# Patient Record
Sex: FEMALE | Race: WHITE | Employment: OTHER | URBAN - METROPOLITAN AREA
[De-identification: names, ages, dates, MRNs, and addresses within clinical notes are randomized per-mention and may not be internally consistent; named-entity substitution may affect disease eponyms.]

---

## 2017-01-27 ENCOUNTER — COMMUNICATION - HEALTHEAST (OUTPATIENT)
Dept: BEHAVIORAL HEALTH | Facility: CLINIC | Age: 72
End: 2017-01-27

## 2017-01-27 DIAGNOSIS — S09.90XA DEPRESSION DUE TO HEAD INJURY: ICD-10-CM

## 2017-01-27 DIAGNOSIS — F32.A DEPRESSION DUE TO HEAD INJURY: ICD-10-CM

## 2017-02-06 ENCOUNTER — OFFICE VISIT - HEALTHEAST (OUTPATIENT)
Dept: BEHAVIORAL HEALTH | Facility: CLINIC | Age: 72
End: 2017-02-06

## 2017-02-06 DIAGNOSIS — S09.90XA DEPRESSION DUE TO HEAD INJURY: ICD-10-CM

## 2017-02-06 DIAGNOSIS — F32.A DEPRESSION DUE TO HEAD INJURY: ICD-10-CM

## 2017-02-06 ASSESSMENT — MIFFLIN-ST. JEOR: SCORE: 1273.33

## 2017-03-08 ENCOUNTER — COMMUNICATION - HEALTHEAST (OUTPATIENT)
Dept: BEHAVIORAL HEALTH | Facility: CLINIC | Age: 72
End: 2017-03-08

## 2017-03-08 DIAGNOSIS — S09.90XA DEPRESSION DUE TO HEAD INJURY: ICD-10-CM

## 2017-03-08 DIAGNOSIS — F32.A DEPRESSION DUE TO HEAD INJURY: ICD-10-CM

## 2017-05-08 ENCOUNTER — COMMUNICATION - HEALTHEAST (OUTPATIENT)
Dept: BEHAVIORAL HEALTH | Facility: CLINIC | Age: 72
End: 2017-05-08

## 2017-05-31 ENCOUNTER — COMMUNICATION - HEALTHEAST (OUTPATIENT)
Dept: BEHAVIORAL HEALTH | Facility: CLINIC | Age: 72
End: 2017-05-31

## 2017-05-31 DIAGNOSIS — F06.30 MOOD DISORDER IN CONDITIONS CLASSIFIED ELSEWHERE: ICD-10-CM

## 2017-06-23 ENCOUNTER — COMMUNICATION - HEALTHEAST (OUTPATIENT)
Dept: BEHAVIORAL HEALTH | Facility: CLINIC | Age: 72
End: 2017-06-23

## 2017-06-23 DIAGNOSIS — F06.30 MOOD DISORDER IN CONDITIONS CLASSIFIED ELSEWHERE: ICD-10-CM

## 2017-07-20 ENCOUNTER — COMMUNICATION - HEALTHEAST (OUTPATIENT)
Dept: BEHAVIORAL HEALTH | Facility: CLINIC | Age: 72
End: 2017-07-20

## 2017-07-20 DIAGNOSIS — F06.30 MOOD DISORDER IN CONDITIONS CLASSIFIED ELSEWHERE: ICD-10-CM

## 2017-07-24 ENCOUNTER — OFFICE VISIT - HEALTHEAST (OUTPATIENT)
Dept: BEHAVIORAL HEALTH | Facility: CLINIC | Age: 72
End: 2017-07-24

## 2017-07-24 DIAGNOSIS — F06.30 MOOD DISORDER IN CONDITIONS CLASSIFIED ELSEWHERE: ICD-10-CM

## 2017-07-24 ASSESSMENT — MIFFLIN-ST. JEOR: SCORE: 1291.47

## 2017-08-09 ENCOUNTER — COMMUNICATION - HEALTHEAST (OUTPATIENT)
Dept: BEHAVIORAL HEALTH | Facility: CLINIC | Age: 72
End: 2017-08-09

## 2017-08-09 DIAGNOSIS — F06.30 MOOD DISORDER IN CONDITIONS CLASSIFIED ELSEWHERE: ICD-10-CM

## 2017-10-23 ENCOUNTER — COMMUNICATION - HEALTHEAST (OUTPATIENT)
Dept: BEHAVIORAL HEALTH | Facility: CLINIC | Age: 72
End: 2017-10-23

## 2017-12-08 ENCOUNTER — COMMUNICATION - HEALTHEAST (OUTPATIENT)
Dept: BEHAVIORAL HEALTH | Facility: CLINIC | Age: 72
End: 2017-12-08

## 2017-12-08 DIAGNOSIS — F06.30 MOOD DISORDER IN CONDITIONS CLASSIFIED ELSEWHERE: ICD-10-CM

## 2017-12-26 ENCOUNTER — COMMUNICATION - HEALTHEAST (OUTPATIENT)
Dept: BEHAVIORAL HEALTH | Facility: CLINIC | Age: 72
End: 2017-12-26

## 2017-12-26 DIAGNOSIS — F06.30 MOOD DISORDER IN CONDITIONS CLASSIFIED ELSEWHERE: ICD-10-CM

## 2018-01-15 ENCOUNTER — OFFICE VISIT - HEALTHEAST (OUTPATIENT)
Dept: BEHAVIORAL HEALTH | Facility: CLINIC | Age: 73
End: 2018-01-15

## 2018-01-15 DIAGNOSIS — F06.30 MOOD DISORDER IN CONDITIONS CLASSIFIED ELSEWHERE: ICD-10-CM

## 2018-01-15 ASSESSMENT — MIFFLIN-ST. JEOR: SCORE: 1350.44

## 2018-01-30 ENCOUNTER — COMMUNICATION - HEALTHEAST (OUTPATIENT)
Dept: BEHAVIORAL HEALTH | Facility: CLINIC | Age: 73
End: 2018-01-30

## 2018-01-30 DIAGNOSIS — F06.30 MOOD DISORDER IN CONDITIONS CLASSIFIED ELSEWHERE: ICD-10-CM

## 2018-03-06 ENCOUNTER — COMMUNICATION - HEALTHEAST (OUTPATIENT)
Dept: BEHAVIORAL HEALTH | Facility: CLINIC | Age: 73
End: 2018-03-06

## 2018-03-07 ENCOUNTER — COMMUNICATION - HEALTHEAST (OUTPATIENT)
Dept: BEHAVIORAL HEALTH | Facility: CLINIC | Age: 73
End: 2018-03-07

## 2018-03-27 ENCOUNTER — COMMUNICATION - HEALTHEAST (OUTPATIENT)
Dept: BEHAVIORAL HEALTH | Facility: CLINIC | Age: 73
End: 2018-03-27

## 2018-05-22 ENCOUNTER — AMBULATORY - HEALTHEAST (OUTPATIENT)
Dept: BEHAVIORAL HEALTH | Facility: CLINIC | Age: 73
End: 2018-05-22

## 2018-05-22 DIAGNOSIS — F06.30 MOOD DISORDER IN CONDITIONS CLASSIFIED ELSEWHERE: ICD-10-CM

## 2018-05-22 DIAGNOSIS — F03.90 MAJOR NEUROCOGNITIVE DISORDER DUE TO ALZHEIMER'S DISEASE, PROBABLE, WITHOUT BEHAVIORAL DISTURBANCE: ICD-10-CM

## 2018-06-04 ENCOUNTER — OFFICE VISIT - HEALTHEAST (OUTPATIENT)
Dept: BEHAVIORAL HEALTH | Facility: CLINIC | Age: 73
End: 2018-06-04

## 2018-06-04 ENCOUNTER — COMMUNICATION - HEALTHEAST (OUTPATIENT)
Dept: BEHAVIORAL HEALTH | Facility: CLINIC | Age: 73
End: 2018-06-04

## 2018-06-04 DIAGNOSIS — F06.30 MOOD DISORDER IN CONDITIONS CLASSIFIED ELSEWHERE: ICD-10-CM

## 2018-06-04 DIAGNOSIS — G30.9 ALZHEIMER DISEASE (H): ICD-10-CM

## 2018-06-04 DIAGNOSIS — F02.80 ALZHEIMER DISEASE (H): ICD-10-CM

## 2018-06-04 ASSESSMENT — MIFFLIN-ST. JEOR: SCORE: 1395.8

## 2018-07-09 ENCOUNTER — COMMUNICATION - HEALTHEAST (OUTPATIENT)
Dept: BEHAVIORAL HEALTH | Facility: CLINIC | Age: 73
End: 2018-07-09

## 2018-07-09 DIAGNOSIS — F06.30 MOOD DISORDER IN CONDITIONS CLASSIFIED ELSEWHERE: ICD-10-CM

## 2018-07-31 ENCOUNTER — COMMUNICATION - HEALTHEAST (OUTPATIENT)
Dept: BEHAVIORAL HEALTH | Facility: CLINIC | Age: 73
End: 2018-07-31

## 2018-07-31 DIAGNOSIS — F06.30 MOOD DISORDER IN CONDITIONS CLASSIFIED ELSEWHERE: ICD-10-CM

## 2018-08-20 ENCOUNTER — COMMUNICATION - HEALTHEAST (OUTPATIENT)
Dept: BEHAVIORAL HEALTH | Facility: CLINIC | Age: 73
End: 2018-08-20

## 2018-08-20 DIAGNOSIS — F06.30 MOOD DISORDER IN CONDITIONS CLASSIFIED ELSEWHERE: ICD-10-CM

## 2018-08-27 ENCOUNTER — OFFICE VISIT - HEALTHEAST (OUTPATIENT)
Dept: BEHAVIORAL HEALTH | Facility: CLINIC | Age: 73
End: 2018-08-27

## 2018-08-27 DIAGNOSIS — F39 MOOD DISORDER (H): ICD-10-CM

## 2018-08-27 ASSESSMENT — MIFFLIN-ST. JEOR: SCORE: 1391.26

## 2018-08-30 ENCOUNTER — COMMUNICATION - HEALTHEAST (OUTPATIENT)
Dept: BEHAVIORAL HEALTH | Facility: CLINIC | Age: 73
End: 2018-08-30

## 2018-08-30 DIAGNOSIS — F06.30 MOOD DISORDER IN CONDITIONS CLASSIFIED ELSEWHERE: ICD-10-CM

## 2018-09-05 ENCOUNTER — COMMUNICATION - HEALTHEAST (OUTPATIENT)
Dept: BEHAVIORAL HEALTH | Facility: CLINIC | Age: 73
End: 2018-09-05

## 2018-10-01 ENCOUNTER — COMMUNICATION - HEALTHEAST (OUTPATIENT)
Dept: BEHAVIORAL HEALTH | Facility: CLINIC | Age: 73
End: 2018-10-01

## 2018-10-01 DIAGNOSIS — F06.30 MOOD DISORDER IN CONDITIONS CLASSIFIED ELSEWHERE: ICD-10-CM

## 2018-10-02 ENCOUNTER — COMMUNICATION - HEALTHEAST (OUTPATIENT)
Dept: BEHAVIORAL HEALTH | Facility: CLINIC | Age: 73
End: 2018-10-02

## 2018-10-23 ENCOUNTER — COMMUNICATION - HEALTHEAST (OUTPATIENT)
Dept: BEHAVIORAL HEALTH | Facility: CLINIC | Age: 73
End: 2018-10-23

## 2018-10-30 ENCOUNTER — COMMUNICATION - HEALTHEAST (OUTPATIENT)
Dept: BEHAVIORAL HEALTH | Facility: CLINIC | Age: 73
End: 2018-10-30

## 2018-11-06 ENCOUNTER — COMMUNICATION - HEALTHEAST (OUTPATIENT)
Dept: BEHAVIORAL HEALTH | Facility: CLINIC | Age: 73
End: 2018-11-06

## 2018-11-12 ENCOUNTER — COMMUNICATION - HEALTHEAST (OUTPATIENT)
Dept: BEHAVIORAL HEALTH | Facility: CLINIC | Age: 73
End: 2018-11-12

## 2018-11-12 DIAGNOSIS — F06.30 MOOD DISORDER IN CONDITIONS CLASSIFIED ELSEWHERE: ICD-10-CM

## 2018-12-03 ENCOUNTER — COMMUNICATION - HEALTHEAST (OUTPATIENT)
Dept: BEHAVIORAL HEALTH | Facility: CLINIC | Age: 73
End: 2018-12-03

## 2018-12-12 ENCOUNTER — COMMUNICATION - HEALTHEAST (OUTPATIENT)
Dept: BEHAVIORAL HEALTH | Facility: CLINIC | Age: 73
End: 2018-12-12

## 2019-06-03 ENCOUNTER — NURSING HOME VISIT (OUTPATIENT)
Dept: GERIATRICS | Facility: CLINIC | Age: 74
End: 2019-06-03
Payer: COMMERCIAL

## 2019-06-03 VITALS
OXYGEN SATURATION: 99 % | TEMPERATURE: 97.8 F | HEART RATE: 92 BPM | RESPIRATION RATE: 18 BRPM | WEIGHT: 163.8 LBS | DIASTOLIC BLOOD PRESSURE: 80 MMHG | SYSTOLIC BLOOD PRESSURE: 120 MMHG

## 2019-06-03 DIAGNOSIS — M62.81 GENERALIZED MUSCLE WEAKNESS: ICD-10-CM

## 2019-06-03 DIAGNOSIS — F31.10 BIPOLAR AFFECTIVE DISORDER, CURRENT EPISODE MANIC, CURRENT EPISODE SEVERITY UNSPECIFIED (H): Primary | ICD-10-CM

## 2019-06-03 DIAGNOSIS — R52 PAIN: ICD-10-CM

## 2019-06-03 DIAGNOSIS — F33.9 RECURRENT MAJOR DEPRESSIVE DISORDER, REMISSION STATUS UNSPECIFIED (H): ICD-10-CM

## 2019-06-03 DIAGNOSIS — D64.9 ANEMIA, UNSPECIFIED TYPE: ICD-10-CM

## 2019-06-03 DIAGNOSIS — E11.8 TYPE 2 DIABETES MELLITUS WITH COMPLICATION, WITHOUT LONG-TERM CURRENT USE OF INSULIN (H): ICD-10-CM

## 2019-06-03 DIAGNOSIS — G47.01 INSOMNIA DUE TO MEDICAL CONDITION: ICD-10-CM

## 2019-06-03 DIAGNOSIS — E55.9 VITAMIN D DEFICIENCY: ICD-10-CM

## 2019-06-03 DIAGNOSIS — E05.90 SUBCLINICAL HYPERTHYROIDISM: ICD-10-CM

## 2019-06-03 DIAGNOSIS — F03.91 DEMENTIA WITH BEHAVIORAL DISTURBANCE, UNSPECIFIED DEMENTIA TYPE: ICD-10-CM

## 2019-06-03 DIAGNOSIS — F22 PARANOIA (H): ICD-10-CM

## 2019-06-03 DIAGNOSIS — K59.01 SLOW TRANSIT CONSTIPATION: ICD-10-CM

## 2019-06-03 PROCEDURE — 99310 SBSQ NF CARE HIGH MDM 45: CPT | Performed by: NURSE PRACTITIONER

## 2019-06-03 RX ORDER — CLOTRIMAZOLE 1 %
CREAM (GRAM) TOPICAL 2 TIMES DAILY
COMMUNITY

## 2019-06-03 RX ORDER — DIVALPROEX SODIUM 250 MG/1
500 TABLET, DELAYED RELEASE ORAL 3 TIMES DAILY
COMMUNITY

## 2019-06-03 RX ORDER — POLYETHYLENE GLYCOL 3350 17 G/17G
1 POWDER, FOR SOLUTION ORAL DAILY
COMMUNITY

## 2019-06-03 RX ORDER — CHOLECALCIFEROL (VITAMIN D3) 50 MCG
1 TABLET ORAL DAILY
COMMUNITY

## 2019-06-03 RX ORDER — TRAZODONE HYDROCHLORIDE 150 MG/1
150 TABLET ORAL
COMMUNITY

## 2019-06-03 RX ORDER — GABAPENTIN 300 MG/1
300 CAPSULE ORAL 2 TIMES DAILY
COMMUNITY

## 2019-06-03 RX ORDER — DULOXETIN HYDROCHLORIDE 20 MG/1
20 CAPSULE, DELAYED RELEASE ORAL DAILY
COMMUNITY

## 2019-06-03 RX ORDER — RISPERIDONE 0.5 MG/1
0.5 TABLET ORAL 3 TIMES DAILY
COMMUNITY

## 2019-06-03 RX ORDER — GABAPENTIN 250 MG/5ML
100 SOLUTION ORAL EVERY 4 HOURS PRN
COMMUNITY

## 2019-06-03 RX ORDER — ASPIRIN 81 MG
100 TABLET, DELAYED RELEASE (ENTERIC COATED) ORAL DAILY
COMMUNITY

## 2019-06-03 RX ORDER — INSULIN GLARGINE 100 [IU]/ML
10 INJECTION, SOLUTION SUBCUTANEOUS AT BEDTIME
COMMUNITY
End: 2019-06-16

## 2019-06-03 NOTE — LETTER
6/3/2019        RE: Jayne Ross  [address] [facility] [address]        [facility]  PRIMARY CARE PROVIDER AND CLINIC:  No primary care provider on file., No primary physician on file.  Chief Complaint   Patient presents with     Establish Care     [facility] Medical Record Number:  [id number]  Place of Service where encounter took place:  [facility] ([facility] [[id number]]    Jayne Ross  is a 74 year old  (1945), admitted to the above facility from  [facility]. Hospital stay 5/16/19 through 5/31/19..  Admitted to this facility for  rehab, medical management and nursing care.    HPI:    HPI information obtained from: facility chart records, facility staff, [facility] chart review and Care Everywhere [facility] chart review.   Brief Summary of Hospital Course:   Patient is a 74 year old woman with PMH of DM2, Bipolar, dementia, depression, anemia, subclinical hyperthyroidism, nontoxic goiter, HLD, PUD, Vit D deficiency, restlessness, agitation who had been diagnosed with dementia in 2016 although family reports seeing symptoms 2 years prior.  She was living with her daughter but care needs became increased with more paranoia and aggressiveness so she moved to [facility] unit on 12/26/18 where she was placed on Zyprexa 5 mg BID.  She was transferred to [facility] 4/30/19-5/16/19 where she had an abnormal UA however UC was neg.  She was started on Haldol, lorazepam and valproic acid however continued to have behaviors of aggression, paranoia, restlessness and psychiatry recommended transfer to [facility] for naima-psych in-patient hospitalization for stability. While at [facility] Headt CT was neg for acute processes.      Upon admission to the psychiatric unit she was very confused, restless, paranoid, agitated.  She was started on (new) Risperidone and (new) gabapentin, and PTA Depakote was continued.    Updates on Status Since Skilled nursing Admission:   Since arriving  to the [facility] memory care unit, nursing reports patient is very restless, aggressive, not following commands or directable, hitting staff, impulsive, crying on/off, self transferring frequently and has put herself on the floor and refused to get staff assist her up off the floor.  Nursing reporting that PRN risperidone is being utilized however 0.5 mg lasts only about 1-1.5 hours.     Today patient is met in the locked memory care unit where she is unable to comment on any symptoms except to deny pain.  She does not follow directions and only has nonsensical mumbling.  She is impulsive and pleasant at this very moment but agreed that at any moment this is likely to change.     CODE STATUS/ADVANCE DIRECTIVES DISCUSSION:   DNR / DNI  Patient's living condition: lives in a skilled nursing facility  ALLERGIES: Glucosamine; Mirtazapine; and Seafood  PAST MEDICAL HISTORY:  has no past medical history on file.  PAST SURGICAL HISTORY:   has no past surgical history on file.  FAMILY HISTORY: family history is not on file.  SOCIAL HISTORY:       Post Discharge Medication Reconciliation Status: discharge medications reconciled, continue medications without change    Current Outpatient Medications   Medication Sig Dispense Refill     clotrimazole (LOTRIMIN) 1 % external cream Apply topically 2 times daily       divalproex sodium delayed-release (DEPAKOTE) 250 MG DR tablet 500 mg every morning, 250 mg every afternoon + evening       docusate sodium (COLACE) 100 MG tablet Take 100 mg by mouth daily       DULoxetine (CYMBALTA) 20 MG capsule Take 20 mg by mouth daily       gabapentin (NEURONTIN) 250 MG/5ML solution Take 100 mg by mouth every 4 hours as needed       gabapentin (NEURONTIN) 300 MG capsule Take 300 mg by mouth 2 times daily       insulin glargine (LANTUS VIAL) 100 UNIT/ML vial Inject 10 Units Subcutaneous At Bedtime       melatonin 5 MG tablet Take 5 mg by mouth nightly as needed If not used in 14 days, okay to  discontinue.       Multiple Vitamins-Minerals (MULTIVITAL PO) Take 1 tablet by mouth daily       polyethylene glycol (MIRALAX/GLYCOLAX) packet Take 1 packet by mouth daily       risperiDONE (RISPERDAL) 0.5 MG tablet Give 0.5 mg by mouth at bedtime for paranoia related to RESTLESSNESS AND AGITATION (R45.1) AND Give 0.5 mg by mouth every 4 hours as needed for paranoia related to RESTLESSNESS AND AGITATION (R45.1) Max of 3 prn doses/24hr       traZODone (DESYREL) 150 MG tablet Give 150 mg by mouth at bedtime related to INSOMNIA, UNSPECIFIED (G47.00) AND Give 50 mg by mouth as needed for insomnia related to INSOMNIA, UNSPECIFIED (G47.00) May repeat ONCE with 50mg BEFORE 0300       vitamin D3 (CHOLECALCIFEROL) 2000 units (50 mcg) tablet Take 1 tablet by mouth daily         ROS:  Unobtainable secondary to cognitive impairment.     Vitals:  /80   Pulse 92   Temp 97.8  F (36.6  C)   Resp 18   Wt 74.3 kg (163 lb 12.8 oz)   SpO2 99%   Exam:  GENERAL APPEARANCE:  Alert, in no overt distress at this time but patient is deconditioned, impulsive, mumbling, confused with nonsensical speech, not following commands  RESP:  respiratory effort and palpation of chest normal, auscultation of lungs clear, no respiratory distress  CV:  Palpation and auscultation of heart done , rate and rhythm regular, no murmur, no LE peripheral edema  ABDOMEN:  normal bowel sounds, soft, nontender, no hepatosplenomegaly or other masses  M/S:   Gait and station ambulatory with mildly unsteady gait, Digits and nails with arthritic changes, reduced muscle mass  SKIN:  Inspection and Palpation of skin and subcutaneous tissue pale, poor turgor  PSYCH:  insight and judgement, memory with severe impairment, affect and mood impulsive and labile, does not follow commands readily         Lab/Diagnostic data:  CBC W PLT NO DIFF (05/27/2019 8:23 AM CDT)  CBC W PLT NO DIFF (05/27/2019 8:23 AM CDT)   Component Value Ref Range Performed At Pathologist  Signature   WHITE BLOOD COUNT  9.7 4.5 - 11.0 thou/cu mm [facility]     RED BLOOD COUNT  4.50 4.00 - 5.20 mil/cu mm [facility]     HEMOGLOBIN  12.7 12.0 - 16.0 g/dL [facility]     HEMATOCRIT  40.9 33.0 - 51.0 % [facility]     MCV  91 80 - 100 fL [facility]     MCH  28.2 26.0 - 34.0 pg [facility]     MCHC  31.1 (L) 32.0 - 36.0 g/dL [facility]     RDW  13.1 11.5 - 15.5 % [facility]     PLATELET COUNT  378 140 - 440 thou/cu mm [facility]     MPV  10.5 6.5 - 11.0 fL [facility]        VALPROIC ACID TOTAL (05/27/2019 8:23 AM CDT)  Only the most recent of 2 results within the time period is included.    VALPROIC ACID TOTAL (05/27/2019 8:23 AM CDT)   Component Value Ref Range Performed At Pathologist [facility]   VALPROIC ACID,TOTAL 38.4 (L) 50.0 - 100.0 ug/mL [facility]         HEMOGLOBIN A1C MONITORING (POCT) (05/27/2019 8:23 AM CDT)  HEMOGLOBIN A1C MONITORING (POCT) (05/27/2019 8:23 AM CDT)   Component Value Ref Range Performed At [facility]   HEMOGLOBIN A1C MONITORING (POCT) 6.9 (H) <=6.4 % [facility]       HEPATIC FUNCTION PANEL (05/27/2019 8:23 AM CDT)  HEPATIC FUNCTION PANEL (05/27/2019 8:23 AM CDT)   Component Value Ref Range Performed At Pathologist [facility]   ALBUMIN 3.5 3.2 - 4.6 g/dL [facility]     PROTEIN,TOTAL 8.0 6.0 - 8.0 g/dL [facility]     GLOBULIN  4.5 (H) 2.0 - 3.7 g/dL [facility]     A/G RATIO 0.8 (L) 1.0 - 2.0  [facility]     BILIRUBIN,TOTAL 0.9 0.2 - 1.2 mg/dL [facility]     BILIRUBIN,DIRECT 0.4 0.1 - 0.5 mg/dL [facility]     BILIRUBIN,INDIRECT  0.5 0.2 - 0.8 mg/dL [facility]     ALK PHOSPHATASE 84 50 - 136 IU/L [facility]     ALT (SGPT) 25 8 - 45 IU/L [facility]     AST (SGOT) 22 2 - 40 IU/L [facility]          URINALYSIS MICROSCOPIC (05/20/2019 1:27 PM CDT)  URINALYSIS MICROSCOPIC (05/20/2019 1:27 PM CDT)   Component Value Ref Range Performed At  Pathologist Signature   RBC 0-2 0-2, None Seen /HPF [facility]     WBC 11-25 (A) 0-2, 3-5, None Seen /HPF [facility]     BACTERIA  Many (A) None Seen, Rare, Few Bacteria/HPF [facility]     EPITHELIAL CELLS  Moderate (A) None Seen, Few Epi/HPF [facility]     RENAL EPITHELIAL CELLS Few Few [facility]     Mucus Present   [facility]       TSH (05/17/2019 7:35 AM CDT)  TSH (05/17/2019 7:35 AM CDT)   Component Value Ref Range Performed At Pathologist Signature   TSH 0.16 (L) 0.35 - 4.94 uIU/mL [facility]      COMP METABOLIC PANEL (05/17/2019 7:35 AM CDT)  COMP METABOLIC PANEL (05/17/2019 7:35 AM CDT)   Component Value Ref Range Performed At Pathologist Signature   SODIUM 143 135 - 145 mmol/L [facility]     POTASSIUM 3.8 3.5 - 5.0 mmol/L [facility]     CHLORIDE 108 98 - 110 mmol/L [facility]     CO2,TOTAL 25 21 - 31 mmol/L [facility]     ANION GAP 10 5 - 18  [facility]     GLUCOSE 108 (H) 65 - 100 mg/dL [facility]     CALCIUM 10.2 8.5 - 10.5 mg/dL [facility]     BUN 12 8 - 25 mg/dL [facility]     CREATININE 0.79 0.57 - 1.11 mg/dL [facility]     BUN/CREAT RATIO  15 10 - 20  [facility]     GFR if African American >60 >60 ml/min/1.73m2 [facility]     GFR if not African American >60 >60 ml/min/1.73m2 [facility]     ALBUMIN 3.6 3.2 - 4.6 g/dL [facility]     PROTEIN,TOTAL 7.1 6.0 - 8.0 g/dL [facility]     GLOBULIN  3.5 2.0 - 3.7 g/dL [facility]     A/G RATIO 1.0 1.0 - 2.0  [facility]     BILIRUBIN,TOTAL 0.9 0.2 - 1.2 mg/dL [facility]     ALK PHOSPHATASE 62 50 - 136 IU/L [facility]     ALT (SGPT) 68 (H) 8 - 45 IU/L [facility]     AST (SGOT) 43 (H) 2 - 40 IU/L [facility]         ASSESSMENT/PLAN:  Type 2 diabetes mellitus with complication, without long-term current use of insulin (H)  Unavailable A1C at time of visit, now found to be 6.9 on last check  On BID blood glucose monitoring:  AM:  136-150  PM: 130-134  On glargine 10 units q HS  On Gabapentin 300 mg BID (unknown neuropathy, also on for behaviors)  On no statin, ASA or ACEI (likely d/t HoTN and goals of care, falls)    PLAN:  - A1C for recheck as checking other labs but then likely can decrease glargine as Goal: HgbA1C between 8-9%. Per [facility] there is potential harm in lowering the A1C to <6.5% in older adults with diabetes.   - discontinue accuchecks (if titrating glargine, likely can recheck at daily or alternating times)    Bipolar affective disorder, current episode manic, current episode severity unspecified (H)  Recurrent major depressive disorder, remission status unspecified (H)  Paranoia (H)  Dementia with behavioral disturbance, unspecified dementia type (Dx 2016 with symptoms 2 yrs prior per family)  Insomnia due to medical condition  Living with her daughter but then moved 12/26/19 to [facility] unit d/t increased care needs, paranoia, restlessness, aggression  Transferred to [facility] for increased behaviors (4/30-5/15) --> naima-psych stay per Psychiatry recommendation (5/16-5/31/19)  Head CT neg at [facility], Abnormal UA but UC neg, electrolytes and LFTs unremarkable    On Duloxetine 20 mg daily, melatonin 5 mg q HS, risperidone 0.5 mg q HS and 0.5 mg q4 hours PRN - used x 6 since arrival with minimal effectiveness, trazodone 150 mg q HS and 50 mg PRN q HS before 0300 (no usage), Depakote 250 mg TID, Gabapentin 300 mg BID and 100 mg q4 hours PRN - no usage since arrival.    nursing reports patient is very restless, aggressive, not following commands or directable, hitting staff, impulsive, crying on/off, self transferring frequently and has put herself on the floor and refused to get staff assist her up off the floor.  Nursing reporting that PRN risperidone is being utilized however 0.5 mg lasts only about 1-1.5 hours. Nursing reports patient sleeping well at night.     Patient today A&O x 0, severely advanced dementia.     PLAN:  -  residing now in locked memory care unit with nursing for supportive cares  - continue risperidone scheduled and PRN  - continue scheduled Gabapentin and PRN but likely will anticipate discontinue of PRN gabapentin when patient settles in as not short-acting med for PRN usage   - continue scheduled and PRN Trazodone but monitor for PRN need  - continue duloxetine at this time  - increase depakote in AM and monitor for titration needs   - depakote level, LFTs  for monitoring  - labs to rule out electrolyte, metabolic etiology  - Vitamin B12 level for monitoring  - Unlikely Melatonin is helping at this time, can change to PRN and if not utilized, discontinue.   - monitor for need to consult in-patient Psychiatry for medication management assistance   - monitor for titration needs    Subclinical hyperthyroidism  TSH (05/17/2019 7:35 AM CDT)  TSH (05/17/2019 7:35 AM CDT)   Component Value Ref Range Performed At Pathologist Signature   TSH 0.16 (L) 0.35 - 4.94 uIU/mL [facility]      Patient unable to comment on symptoms but it very restless, aggressive, etc.    PLAN:  - monitor at this time.     Anemia, unspecified type  Last Hgb 12.7  On MV daily    PLAN:  - recheck Hgb for trending  - check Vitamin B12 level    Vitamin D deficiency  On vitamin D 2000 units daily  No level on file  Patient high risk for falls    PLAN:  - check Vitamin D level fo rmonitoring    Slow transit constipation  On docusate 100 gm daily, miralax 17 gm daily  Patient unable to comment on symptoms of constipation    PLAN:  - monitor SNF EHR for BM pattern and titration needs    Pain  Analgesia with Gabapentin 300 mg BID and 100 mg q4 hours PRn (likely for behaviors, not analgesia)  Patient denies pain today (may be poor historian)    PLAN:  - continue Gabapentin for above  - JAQUAN Tylenol, monitor for need    Generalized muscle weakness  Severely advanced dementia with high falls risk and history of falls    PLAN:  - Physical Therapy,  Occupational Therapy for strengthening, etc.        transcribed by : Breonna Coleman  1. CMP, CBC, Depakote level, Hgb, Alc, Vit D level, Vit B12 level on 6/4/19. Please coordinate prn Risperidone dosing with lab draw. Dx: Bipolar, DM2, Dementia, Vit D def, Anemia.   2. Discontinue Accuchecks  3. Increase Depakote to 500 mg every morning, 250 mg every afternoon + evening. Dx: Bipolar  4. Change Melatonin to prn + if not used un 14 days, ok to discontinue.       Total time spent with patient visit at the skilled nursing facility was >40 minutes including patient visit, review of past records and coordinationof care with nursing. Greater than 50% of total time spent with counseling and coordinating care due to review of chart as pt is poor historian and minimal paperwork sent with patient, coordination of care with nursing, patient visit.  Electronically signed by:  ALYSSA Mckeon CNP                         Sincerely,        ALYSSA Mckeon CNP

## 2019-06-03 NOTE — PROGRESS NOTES
[place] [facility]  PRIMARY CARE PROVIDER AND CLINIC:  No primary care provider on file., No primary physician on file.  Chief Complaint   Patient presents with     Establish Care     [place] Medical Record Number:  [id number]  Place of Service where encounter took place:  [facility] [[id number]]    Jayne Ross  is a 74 year old  (1945), admitted to the above facility from  [facility]. Hospital stay 5/16/19 through 5/31/19..  Admitted to this facility for  rehab, medical management and nursing care.    HPI:    HPI information obtained from: facility chart records, facility staff, [place] Epic chart review and Care Everywhere [facility] chart review.   Brief Summary of Hospital Course:   Patient is a 74 year old woman with PMH of DM2, Bipolar, dementia, depression, anemia, subclinical hyperthyroidism, nontoxic goiter, HLD, PUD, Vit D deficiency, restlessness, agitation who had been diagnosed with dementia in 2016 although family reports seeing symptoms 2 years prior.  She was living with her daughter but care needs became increased with more paranoia and aggressiveness so she moved to [facility] unit on 12/26/18 where she was placed on Zyprexa 5 mg BID.  She was transferred to [facility] 4/30/19-5/16/19 where she had an abnormal UA however UC was neg.  She was started on Haldol, lorazepam and valproic acid however continued to have behaviors of aggression, paranoia, restlessness and psychiatry recommended transfer to [facility] for naima-psych in-patient hospitalization for stability. While at [facility] Headt CT was neg for acute processes.      Upon admission to the psychiatric unit she was very confused, restless, paranoid, agitated.  She was started on (new) Risperidone and (new) gabapentin, and PTA Depakote was continued.    Updates on Status Since Skilled nursing Admission:   Since arriving to the [facility] memory care unit, nursing reports patient is very restless, aggressive,  not following commands or directable, hitting staff, impulsive, crying on/off, self transferring frequently and has put herself on the floor and refused to get staff assist her up off the floor.  Nursing reporting that PRN risperidone is being utilized however 0.5 mg lasts only about 1-1.5 hours.     Today patient is met in the locked memory care unit where she is unable to comment on any symptoms except to deny pain.  She does not follow directions and only has nonsensical mumbling.  She is impulsive and pleasant at this very moment but agreed that at any moment this is likely to change.     CODE STATUS/ADVANCE DIRECTIVES DISCUSSION:   DNR / DNI  Patient's living condition: lives in a skilled nursing facility  ALLERGIES: Glucosamine; Mirtazapine; and Seafood  PAST MEDICAL HISTORY:  has no past medical history on file.  PAST SURGICAL HISTORY:   has no past surgical history on file.  FAMILY HISTORY: family history is not on file.  SOCIAL HISTORY:       Post Discharge Medication Reconciliation Status: discharge medications reconciled, continue medications without change    Current Outpatient Medications   Medication Sig Dispense Refill     clotrimazole (LOTRIMIN) 1 % external cream Apply topically 2 times daily       divalproex sodium delayed-release (DEPAKOTE) 250 MG DR tablet 500 mg every morning, 250 mg every afternoon + evening       docusate sodium (COLACE) 100 MG tablet Take 100 mg by mouth daily       DULoxetine (CYMBALTA) 20 MG capsule Take 20 mg by mouth daily       gabapentin (NEURONTIN) 250 MG/5ML solution Take 100 mg by mouth every 4 hours as needed       gabapentin (NEURONTIN) 300 MG capsule Take 300 mg by mouth 2 times daily       insulin glargine (LANTUS VIAL) 100 UNIT/ML vial Inject 10 Units Subcutaneous At Bedtime       melatonin 5 MG tablet Take 5 mg by mouth nightly as needed If not used in 14 days, okay to discontinue.       Multiple Vitamins-Minerals (MULTIVITAL PO) Take 1 tablet by mouth daily        polyethylene glycol (MIRALAX/GLYCOLAX) packet Take 1 packet by mouth daily       risperiDONE (RISPERDAL) 0.5 MG tablet Give 0.5 mg by mouth at bedtime for paranoia related to RESTLESSNESS AND AGITATION (R45.1) AND Give 0.5 mg by mouth every 4 hours as needed for paranoia related to RESTLESSNESS AND AGITATION (R45.1) Max of 3 prn doses/24hr       traZODone (DESYREL) 150 MG tablet Give 150 mg by mouth at bedtime related to INSOMNIA, UNSPECIFIED (G47.00) AND Give 50 mg by mouth as needed for insomnia related to INSOMNIA, UNSPECIFIED (G47.00) May repeat ONCE with 50mg BEFORE 0300       vitamin D3 (CHOLECALCIFEROL) 2000 units (50 mcg) tablet Take 1 tablet by mouth daily         ROS:  Unobtainable secondary to cognitive impairment.     Vitals:  /80   Pulse 92   Temp 97.8  F (36.6  C)   Resp 18   Wt 74.3 kg (163 lb 12.8 oz)   SpO2 99%   Exam:  GENERAL APPEARANCE:  Alert, in no overt distress at this time but patient is deconditioned, impulsive, mumbling, confused with nonsensical speech, not following commands  RESP:  respiratory effort and palpation of chest normal, auscultation of lungs clear, no respiratory distress  CV:  Palpation and auscultation of heart done , rate and rhythm regular, no murmur, no LE peripheral edema  ABDOMEN:  normal bowel sounds, soft, nontender, no hepatosplenomegaly or other masses  M/S:   Gait and station ambulatory with mildly unsteady gait, Digits and nails with arthritic changes, reduced muscle mass  SKIN:  Inspection and Palpation of skin and subcutaneous tissue pale, poor turgor  PSYCH:  insight and judgement, memory with severe impairment, affect and mood impulsive and labile, does not follow commands readily         Lab/Diagnostic data:  CBC W PLT NO DIFF (05/27/2019 8:23 AM CDT)  CBC W PLT NO DIFF (05/27/2019 8:23 AM CDT)   Component Value Ref Range Performed At Pathologist Signature   WHITE BLOOD COUNT  9.7 4.5 - 11.0 thou/cu [facility]     RED BLOOD COUNT  4.50  4.00 - 5.20 mil/cu mm [facility]     HEMOGLOBIN  12.7 12.0 - 16.0 g/dL [facility]     HEMATOCRIT  40.9 33.0 - 51.0 % [facility]     MCV  91 80 - 100 fL [facility]     MCH  28.2 26.0 - 34.0 pg [facility]     MCHC  31.1 (L) 32.0 - 36.0 g/dL [facility]     RDW  13.1 11.5 - 15.5 % [facility]     PLATELET COUNT  378 140 - 440 thou/cu mm [facility]     MPV  10.5 6.5 - 11.0 fL [facility]        VALPROIC ACID TOTAL (05/27/2019 8:23 AM CDT)  Only the most recent of 2 results within the time period is included.    VALPROIC ACID TOTAL (05/27/2019 8:23 AM CDT)   Component Value Ref Range Performed At Pathologist Signature   VALPROIC ACID,TOTAL 38.4 (L) 50.0 - 100.0 ug/mL [facility]         HEMOGLOBIN A1C MONITORING (POCT) (05/27/2019 8:23 AM CDT)  HEMOGLOBIN A1C MONITORING (POCT) (05/27/2019 8:23 AM CDT)   Component Value Ref Range Performed At Pathologist Signature   HEMOGLOBIN A1C MONITORING (POCT) 6.9 (H) <=6.4 % [facility]       HEPATIC FUNCTION PANEL (05/27/2019 8:23 AM CDT)  HEPATIC FUNCTION PANEL (05/27/2019 8:23 AM CDT)   Component Value Ref Range Performed At Pathologist Signature   ALBUMIN 3.5 3.2 - 4.6 g/dL [facility]     PROTEIN,TOTAL 8.0 6.0 - 8.0 g/dL [facility]     GLOBULIN  4.5 (H) 2.0 - 3.7 g/dL [facility]     A/G RATIO 0.8 (L) 1.0 - 2.0  [facility]     BILIRUBIN,TOTAL 0.9 0.2 - 1.2 mg/dL [facility]     BILIRUBIN,DIRECT 0.4 0.1 - 0.5 mg/dL [facility]     BILIRUBIN,INDIRECT  0.5 0.2 - 0.8 mg/dL [facility]     ALK PHOSPHATASE 84 50 - 136 IU/L [facility]     ALT (SGPT) 25 8 - 45 IU/L [facility]     AST (SGOT) 22 2 - 40 IU/L [facility]          URINALYSIS MICROSCOPIC (05/20/2019 1:27 PM CDT)  URINALYSIS MICROSCOPIC (05/20/2019 1:27 PM CDT)   Component Value Ref Range Performed At Pathologist Signature   RBC 0-2 0-2, None Seen /HPF [facility]     WBC 11-25 (A) 0-2, 3-5, None Seen  /HPF [facility]     BACTERIA  Many (A) None Seen, Rare, Few Bacteria/HPF [facility]     EPITHELIAL CELLS  Moderate (A) None Seen, Few Epi/HPF [facility]     RENAL EPITHELIAL CELLS Few Few [facility]     Mucus Present   [facility]       TSH (05/17/2019 7:35 AM CDT)  TSH (05/17/2019 7:35 AM CDT)   Component Value Ref Range Performed At Pathologist Signature   TSH 0.16 (L) 0.35 - 4.94 uIU/mL [facility]      COMP METABOLIC PANEL (05/17/2019 7:35 AM CDT)  COMP METABOLIC PANEL (05/17/2019 7:35 AM CDT)   Component Value Ref Range Performed At Pathologist Signature   SODIUM 143 135 - 145 mmol/L [facility]     POTASSIUM 3.8 3.5 - 5.0 mmol/L [facility]     CHLORIDE 108 98 - 110 mmol/L [facility]     CO2,TOTAL 25 21 - 31 mmol/L [facility]     ANION GAP 10 5 - 18  [facility]     GLUCOSE 108 (H) 65 - 100 mg/dL [facility]     CALCIUM 10.2 8.5 - 10.5 mg/dL [facility]     BUN 12 8 - 25 mg/dL [facility]     CREATININE 0.79 0.57 - 1.11 mg/dL [facility]     BUN/CREAT RATIO  15 10 - 20  [facility]     GFR if African American >60 >60 ml/min/1.73m2 [facility]     GFR if not African American >60 >60 ml/min/1.73m2 [facility]     ALBUMIN 3.6 3.2 - 4.6 g/dL [facility]     PROTEIN,TOTAL 7.1 6.0 - 8.0 g/dL [facility]     GLOBULIN  3.5 2.0 - 3.7 g/dL [facility]     A/G RATIO 1.0 1.0 - 2.0  [facility]     BILIRUBIN,TOTAL 0.9 0.2 - 1.2 mg/dL [facility]     ALK PHOSPHATASE 62 50 - 136 IU/L [facility]     ALT (SGPT) 68 (H) 8 - 45 IU/L [facility]     AST (SGOT) 43 (H) 2 - 40 IU/L [facility]         ASSESSMENT/PLAN:  Type 2 diabetes mellitus with complication, without long-term current use of insulin (H)  Unavailable A1C at time of visit, now found to be 6.9 on last check  On BID blood glucose monitoring:  AM: 136-150  PM: 130-134  On glargine 10 units q HS  On Gabapentin 300 mg BID (unknown neuropathy, also on for  behaviors)  On no statin, ASA or ACEI (likely d/t HoTN and goals of care, falls)    PLAN:  - A1C for recheck as checking other labs but then likely can decrease glargine as Goal: HgbA1C between 8-9%. Per AGS there is potential harm in lowering the A1C to <6.5% in older adults with diabetes.   - discontinue accuchecks (if titrating glargine, likely can recheck at daily or alternating times)    Bipolar affective disorder, current episode manic, current episode severity unspecified (H)  Recurrent major depressive disorder, remission status unspecified (H)  Paranoia (H)  Dementia with behavioral disturbance, unspecified dementia type (Dx 2016 with symptoms 2 yrs prior per family)  Insomnia due to medical condition  Living with her daughter but then moved 12/26/19 to [facility] unit d/t increased care needs, paranoia, restlessness, aggression  Transferred to [facility] for increased behaviors (4/30-5/15) --> naima-psych stay per Psychiatry recommendation (5/16-5/31/19)  Head CT neg at [facility], Abnormal UA but UC neg, electrolytes and LFTs unremarkable    On Duloxetine 20 mg daily, melatonin 5 mg q HS, risperidone 0.5 mg q HS and 0.5 mg q4 hours PRN - used x 6 since arrival with minimal effectiveness, trazodone 150 mg q HS and 50 mg PRN q HS before 0300 (no usage), Depakote 250 mg TID, Gabapentin 300 mg BID and 100 mg q4 hours PRN - no usage since arrival.    nursing reports patient is very restless, aggressive, not following commands or directable, hitting staff, impulsive, crying on/off, self transferring frequently and has put herself on the floor and refused to get staff assist her up off the floor.  Nursing reporting that PRN risperidone is being utilized however 0.5 mg lasts only about 1-1.5 hours. Nursing reports patient sleeping well at night.     Patient today A&O x 0, severely advanced dementia.     PLAN:  - residing now in locked memory care unit with nursing for supportive cares  - continue risperidone  scheduled and PRN  - continue scheduled Gabapentin and PRN but likely will anticipate discontinue of PRN gabapentin when patient settles in as not short-acting med for PRN usage   - continue scheduled and PRN Trazodone but monitor for PRN need  - continue duloxetine at this time  - increase depakote in AM and monitor for titration needs   - depakote level, LFTs  for monitoring  - labs to rule out electrolyte, metabolic etiology  - Vitamin B12 level for monitoring  - Unlikely Melatonin is helping at this time, can change to PRN and if not utilized, discontinue.   - monitor for need to consult in-patient Psychiatry for medication management assistance   - monitor for titration needs    Subclinical hyperthyroidism  TSH (05/17/2019 7:35 AM CDT)  TSH (05/17/2019 7:35 AM CDT)   Component Value Ref Range Performed At Pathologist Signature   TSH 0.16 (L) 0.35 - 4.94 uIU/mL [facility]      Patient unable to comment on symptoms but it very restless, aggressive, etc.    PLAN:  - monitor at this time.     Anemia, unspecified type  Last Hgb 12.7  On MV daily    PLAN:  - recheck Hgb for trending  - check Vitamin B12 level    Vitamin D deficiency  On vitamin D 2000 units daily  No level on file  Patient high risk for falls    PLAN:  - check Vitamin D level fo rmonitoring    Slow transit constipation  On docusate 100 gm daily, miralax 17 gm daily  Patient unable to comment on symptoms of constipation    PLAN:  - monitor SNF EHR for BM pattern and titration needs    Pain  Analgesia with Gabapentin 300 mg BID and 100 mg q4 hours PRn (likely for behaviors, not analgesia)  Patient denies pain today (may be poor historian)    PLAN:  - continue Gabapentin for above  - JAQUAN Tylenol, monitor for need    Generalized muscle weakness  Severely advanced dementia with high falls risk and history of falls    PLAN:  - Physical Therapy, Occupational Therapy for strengthening, etc.        transcribed by : Breonna Coleman  1. CMP,  CBC, Depakote level, Hgb, Alc, Vit D level, Vit B12 level on 6/4/19. Please coordinate prn Risperidone dosing with lab draw. Dx: Bipolar, DM2, Dementia, Vit D def, Anemia.   2. Discontinue Accuchecks  3. Increase Depakote to 500 mg every morning, 250 mg every afternoon + evening. Dx: Bipolar  4. Change Melatonin to prn + if not used un 14 days, ok to discontinue.       Total time spent with patient visit at the skilled nursing facility was >40 minutes including patient visit, review of past records and coordinationof care with nursing. Greater than 50% of total time spent with counseling and coordinating care due to review of chart as pt is poor historian and minimal paperwork sent with patient, coordination of care with nursing, patient visit.  Electronically signed by:  ALYSSA Mckeon CNP

## 2019-06-11 VITALS
SYSTOLIC BLOOD PRESSURE: 123 MMHG | TEMPERATURE: 98.2 F | HEART RATE: 88 BPM | OXYGEN SATURATION: 98 % | WEIGHT: 163.8 LBS | DIASTOLIC BLOOD PRESSURE: 78 MMHG | RESPIRATION RATE: 16 BRPM

## 2019-06-11 RX ORDER — FLUTICASONE PROPIONATE 50 MCG
1 SPRAY, SUSPENSION (ML) NASAL DAILY
COMMUNITY

## 2019-06-13 ENCOUNTER — NURSING HOME VISIT (OUTPATIENT)
Dept: GERIATRICS | Facility: CLINIC | Age: 74
End: 2019-06-13
Payer: COMMERCIAL

## 2019-06-13 DIAGNOSIS — F41.8 DEPRESSION WITH ANXIETY: ICD-10-CM

## 2019-06-13 DIAGNOSIS — F03.91 DEMENTIA WITH BEHAVIORAL DISTURBANCE, UNSPECIFIED DEMENTIA TYPE: Primary | ICD-10-CM

## 2019-06-13 DIAGNOSIS — E11.8 TYPE 2 DIABETES MELLITUS WITH COMPLICATION, WITHOUT LONG-TERM CURRENT USE OF INSULIN (H): ICD-10-CM

## 2019-06-13 DIAGNOSIS — F31.9 BIPOLAR AFFECTIVE DISORDER, REMISSION STATUS UNSPECIFIED (H): ICD-10-CM

## 2019-06-13 DIAGNOSIS — K59.01 SLOW TRANSIT CONSTIPATION: ICD-10-CM

## 2019-06-13 PROCEDURE — 99305 1ST NF CARE MODERATE MDM 35: CPT | Performed by: INTERNAL MEDICINE

## 2019-06-13 NOTE — PROGRESS NOTES
[place] [facility]  INITIAL VISIT NOTE  June 13, 2019    PRIMARY CARE PROVIDER AND CLINIC:  Yashira Andrade [address] [address] / [place] [address]    Chief Complaint   Patient presents with     [facility]       HPI:    Jayne Ross is a 74 year old  (1945) female who was seen at [facility] on June 13, 2019 for an initial visit. Medical history is notable for dementia, bipolar disorder and DM II. She was hospitalized at [facility] from 4/30/19 to 5/16/19 where she presented with altered mental status. Psychiatry was consulted and PTA quetiapine was discontinued and she was started on haloperidol, lorazepam and depakote. She was transferred to the inpatient geriatric psychiatry unit ([facility]) at [facility] from 5/16/19 to 5/31/19. That H&P states she had been exhibiting severe agitation, paranoia, impulsivity and combativeness. The haloperidol and lorazepam were discontinued and she was started on scheduled gabapentin as well as risperidone and trazodone. She also appears to have been started on glargine. She was admitted to this facility for medical management and rehab.     Today, Ms. Ross is seen in the secure memory care unit. History is limited due to her dementia. Since arriving at the facility she has been impulsive, restless and has episodes of crying. Morning dose of depakote was increased with some improvement. In house psychiatry has been consulted. I was able to speak with SLP who liberalized her diet yesterday and so far this is going well. No concerns today per nursing.     CODE STATUS:   DNR / DNI    ALLERGIES:     Allergies   Allergen Reactions     Glucosamine      Mirtazapine      Seafood        PAST MEDICAL HISTORY:   Dementia, bipolar disorder and DM II    PAST SURGICAL HISTORY:   No past surgical history on file.    FAMILY HISTORY:   Unable to review due to dementia     SOCIAL HISTORY:   Lives in SNF    MEDICATIONS:  Current Outpatient  Medications   Medication Sig Dispense Refill     traZODone (DESYREL) 50 MG tablet Take 50 mg by mouth daily as needed for sleep For persistent insomnia. Also has 150 mg scheduled at bedtime Max 200 mg/night.       clotrimazole (LOTRIMIN) 1 % external cream Apply topically 2 times daily       divalproex sodium delayed-release (DEPAKOTE) 250 MG DR tablet 500 mg every morning, 250 mg every afternoon + evening       docusate sodium (COLACE) 100 MG tablet Take 100 mg by mouth daily       DULoxetine (CYMBALTA) 20 MG capsule Take 20 mg by mouth daily       fluticasone (FLONASE) 50 MCG/ACT nasal spray Spray 1 spray into both nostrils daily       gabapentin (NEURONTIN) 250 MG/5ML solution Take 100 mg by mouth every 4 hours as needed       gabapentin (NEURONTIN) 300 MG capsule Take 300 mg by mouth 2 times daily       melatonin 5 MG tablet Take 5 mg by mouth nightly as needed If not used in 14 days, okay to discontinue.       Multiple Vitamins-Minerals (MULTIVITAL PO) Take 1 tablet by mouth daily       polyethylene glycol (MIRALAX/GLYCOLAX) packet Take 1 packet by mouth daily       risperiDONE (RISPERDAL) 0.5 MG tablet 0.5 mg 2 times daily and 0.5 mg q4h PRN for paranoia       traZODone (DESYREL) 150 MG tablet Take 150 mg by mouth        vitamin D3 (CHOLECALCIFEROL) 2000 units (50 mcg) tablet Take 1 tablet by mouth daily           ROS:  Unable to obtain due to cognitive impairment or aphasia    PHYSICAL EXAM:  /78   Pulse 88   Temp 98.2  F (36.8  C)   Resp 16   Wt 74.3 kg (163 lb 12.8 oz)   SpO2 98%    Gen: sitting in wheelchair, alert, cooperative and in no acute distress  HEENT: normocephalic; oropharynx clear  Resp: breathing non labored  GI: abdomen soft,non-distended  MSK: normal muscle tone, no LE edema  Neuro: CX II-XII grossly in tact; ROM in all four extremities grossly in tact  Psych: memory, judgement and insight impaired    LABORATORY/IMAGING DATA:  Reviewed as per Epic    ASSESSMENT/PLAN:    Dementia  With Behavioral Disturbance  Bipolar Disorder  Depression / Anxiety  Continues to have periods of restlessness, impulsivity and crying out. Morning dose of depakote has been increased since arriving at the facility with limited benefit.   -- continues on depakote 500 mg qam and 250 mg in the afternoon and evening, duloxetine 20 mg daily, gabapentin 300 mg BID and 100 mg q4h PRN, risperidone 0.5 mg BID and 0.25 mg q4h PRN for paranoia and trazodone 150 mg at bedtime with additional 50 mg PRN overnight for persistent insomnia  -- in house ACP psychiatry has been consulted - very much appreciate their assistance/expertise  -- ongoing 24/7 nursing and supportive cares     DM, Type II  Hgb A1c 6.9 on 6/4/19. Blood sugars 130s overall. Glargine was started during inpatient [facility] stay and has been discontinued since admission to this facility.   -- can check sugars PRN    Slow Transit Constipation  -- continues on docusate 100 mg daily and 17g daily   -- adjust bowel regimen as needed    Electronically signed by:  Joanna Saavedra MD

## 2019-06-13 NOTE — LETTER
6/13/2019        RE: Jayne Ross  [address] [facility] [place] [address]        [place] [facility]  INITIAL VISIT NOTE  June 13, 2019    PRIMARY CARE PROVIDER AND CLINIC:  Yashira Andrade [address] [address] / [place] [address]    Chief Complaint   Patient presents with     [facility]       HPI:    Jayne Ross is a 74 year old  (1945) female who [facility] 2019 for an initial visit. Medical history is notable for dementia, bipolar disorder and DM II. She was hospitalized at [facility] from 4/30/19 to 5/16/19 where she presented with altered mental status. Psychiatry was consulted and PTA quetiapine was discontinued and she was started on haloperidol, lorazepam and depakote. She was transferred to the inpatient geriatric psychiatry unit ([facility]) at [facility] in [place] from 5/16/19 to 5/31/19. That H&P states she had been exhibiting severe agitation, paranoia, impulsivity and combativeness. The haloperidol and lorazepam were discontinued and she was started on scheduled gabapentin as well as risperidone and trazodone. She also appears to have been started on glargine. She was admitted to this facility for medical management and rehab.     Today, Ms. Ross is seen in the secure memory care unit. History is limited due to her dementia. Since arriving at the facility she has been impulsive, restless and has episodes of crying. Morning dose of depakote was increased with some improvement. In house psychiatry has been consulted. I was able to speak with SLP who liberalized her diet yesterday and so far this is going well. No concerns today per nursing.     CODE STATUS:   DNR / DNI    ALLERGIES:     Allergies   Allergen Reactions     Glucosamine      Mirtazapine      Seafood        PAST MEDICAL HISTORY:   Dementia, bipolar disorder and DM II    PAST SURGICAL HISTORY:   No past surgical history on file.    FAMILY HISTORY:   Unable to review due to  dementia     SOCIAL HISTORY:   Lives in SNF    MEDICATIONS:  Current Outpatient Medications   Medication Sig Dispense Refill     traZODone (DESYREL) 50 MG tablet Take 50 mg by mouth daily as needed for sleep For persistent insomnia. Also has 150 mg scheduled at bedtime Max 200 mg/night.       clotrimazole (LOTRIMIN) 1 % external cream Apply topically 2 times daily       divalproex sodium delayed-release (DEPAKOTE) 250 MG DR tablet 500 mg every morning, 250 mg every afternoon + evening       docusate sodium (COLACE) 100 MG tablet Take 100 mg by mouth daily       DULoxetine (CYMBALTA) 20 MG capsule Take 20 mg by mouth daily       fluticasone (FLONASE) 50 MCG/ACT nasal spray Spray 1 spray into both nostrils daily       gabapentin (NEURONTIN) 250 MG/5ML solution Take 100 mg by mouth every 4 hours as needed       gabapentin (NEURONTIN) 300 MG capsule Take 300 mg by mouth 2 times daily       melatonin 5 MG tablet Take 5 mg by mouth nightly as needed If not used in 14 days, okay to discontinue.       Multiple Vitamins-Minerals (MULTIVITAL PO) Take 1 tablet by mouth daily       polyethylene glycol (MIRALAX/GLYCOLAX) packet Take 1 packet by mouth daily       risperiDONE (RISPERDAL) 0.5 MG tablet 0.5 mg 2 times daily and 0.5 mg q4h PRN for paranoia       traZODone (DESYREL) 150 MG tablet Take 150 mg by mouth        vitamin D3 (CHOLECALCIFEROL) 2000 units (50 mcg) tablet Take 1 tablet by mouth daily           ROS:  Unable to obtain due to cognitive impairment or aphasia    PHYSICAL EXAM:  /78   Pulse 88   Temp 98.2  F (36.8  C)   Resp 16   Wt 74.3 kg (163 lb 12.8 oz)   SpO2 98%    Gen: sitting in wheelchair, alert, cooperative and in no acute distress  HEENT: normocephalic; oropharynx clear  Resp: breathing non labored  GI: abdomen soft,non-distended  MSK: normal muscle tone, no LE edema  Neuro: CX II-XII grossly in tact; ROM in all four extremities grossly in tact  Psych: memory, judgement and insight  impaired    LABORATORY/IMAGING DATA:  Reviewed as per Epic    ASSESSMENT/PLAN:    Dementia With Behavioral Disturbance  Bipolar Disorder  Depression / Anxiety  Continues to have periods of restlessness, impulsivity and crying out. Morning dose of depakote has been increased since arriving at the facility with limited benefit.   -- continues on depakote 500 mg qam and 250 mg in the afternoon and evening, duloxetine 20 mg daily, gabapentin 300 mg BID and 100 mg q4h PRN, risperidone 0.5 mg BID and 0.25 mg q4h PRN for paranoia and trazodone 150 mg at bedtime with additional 50 mg PRN overnight for persistent insomnia  -- in house ACP psychiatry has been consulted - very much appreciate their assistance/expertise  -- ongoing 24/7 nursing and supportive cares     DM, Type II  Hgb A1c 6.9 on 6/4/19. Blood sugars 130s overall. Glargine was started during inpatient geripsych stay and has been discontinued since admission to this facility.   -- can check sugars PRN    Slow Transit Constipation  -- continues on docusate 100 mg daily and 17g daily   -- adjust bowel regimen as needed    Electronically signed by:  Joanna Saavedra MD                        Sincerely,        Joanna Saavedra MD

## 2019-06-16 RX ORDER — TRAZODONE HYDROCHLORIDE 50 MG/1
50 TABLET, FILM COATED ORAL DAILY PRN
COMMUNITY

## 2019-06-25 ENCOUNTER — NURSING HOME VISIT (OUTPATIENT)
Dept: GERIATRICS | Facility: CLINIC | Age: 74
End: 2019-06-25
Payer: COMMERCIAL

## 2019-06-25 VITALS
TEMPERATURE: 97.3 F | DIASTOLIC BLOOD PRESSURE: 54 MMHG | OXYGEN SATURATION: 97 % | RESPIRATION RATE: 18 BRPM | SYSTOLIC BLOOD PRESSURE: 130 MMHG | WEIGHT: 163.8 LBS | HEART RATE: 90 BPM

## 2019-06-25 DIAGNOSIS — F33.9 RECURRENT MAJOR DEPRESSIVE DISORDER, REMISSION STATUS UNSPECIFIED (H): ICD-10-CM

## 2019-06-25 DIAGNOSIS — F22 PARANOIA (H): ICD-10-CM

## 2019-06-25 DIAGNOSIS — G47.01 INSOMNIA DUE TO MEDICAL CONDITION: ICD-10-CM

## 2019-06-25 DIAGNOSIS — F03.91 DEMENTIA WITH BEHAVIORAL DISTURBANCE, UNSPECIFIED DEMENTIA TYPE: ICD-10-CM

## 2019-06-25 DIAGNOSIS — F31.10 BIPOLAR AFFECTIVE DISORDER, CURRENT EPISODE MANIC, CURRENT EPISODE SEVERITY UNSPECIFIED (H): Primary | ICD-10-CM

## 2019-06-25 PROCEDURE — 99309 SBSQ NF CARE MODERATE MDM 30: CPT | Performed by: NURSE PRACTITIONER

## 2019-06-25 RX ORDER — LORAZEPAM 0.5 MG/1
1 TABLET ORAL EVERY 4 HOURS PRN
COMMUNITY
End: 2019-07-01

## 2019-06-25 NOTE — LETTER
6/25/2019        RE: Jayne Ross  [address] [facility] [address]        [place] [facility]  [place] Medical Record Number:  [id number]  Place of Service where encounter took place:  [facility] ([facility]) [[id number]]  Chief Complaint   Patient presents with     Nursing Home Acute       HPI:    Jayne Ross  is a 74 year old (1945), who is being seen today for an episodic care visit.  HPI information obtained from: facility chart records, facility staff, patient report and [facility] chart review. Today's concern is:     Bipolar affective disorder, current episode manic, current episode severity unspecified (H)  Dementia with behavioral disturbance, unspecified dementia type  Recurrent major depressive disorder, remission status unspecified (H)  Paranoia (H)  Insomnia due to medical condition     Per Epic note/Hx:  PMH including Bipolar; diagnosed with dementia in 2016 although family reports seeing symptoms 2 years prior.  She was living with her daughter but care needs became increased with more paranoia and aggressiveness so she moved to [facility] unit on 12/26/18 where she was placed on Zyprexa 5 mg BID.  She was transferred to [facility] 4/30/19-5/16/19 where she had an abnormal UA however UC was neg.  She was started on Haldol, lorazepam and valproic acid however continued to have behaviors of aggression, paranoia, restlessness and psychiatry recommended transfer to [facility] for naima-psych in-patient hospitalization for stability. While at [facility] Head CT was neg for acute processes.      Since arriving to [facility] for LTC placement, patient has been restless, aggressive and frequently requiring 1:1 nursing care.     Patient is currently on Depakote 500 mg twice daily (AM/PM) and 250 mg in afternoon, lorazepam 0.5 mg twice daily as needed with option to repeat dose in 1 hour if not effective, Risperdal 0.5 mg 3 times daily and 0.5 mg every 4 hours as  "needed, melatonin as needed, duloxetine 20 mg daily, trazodone 150 mg nightly, gabapentin 300 mg twice daily and 100 mg every 4 hours as needed.  Nursing reporting PRN Risperdal used frequently and effective but very short duration of effectiveness.  PRN gabapentin used x1 and last 14 days as charted as \"effective\".  PRN lorazepam being used and nursing reporting patient more focused with 1 mg dosing, but still restless and aggressive at times.  Nursing reporting that today with scheduled and PRN risperidone plus PRN Ativan x2 patient was able to sleep for 20 minutes.    Nursing reporting behaviors including aggression including hitting, running her walker into staff and other residents and visitors, disrobing in common areas.  Nursing reporting patient is restless and often very determined, unable to be redirected.  Other staff reports that patient can be very tearful at times and repeating that she wants to go home, and does better with a soft/gentle touch.  Nursing has noticed that noise can trigger behaviors and often feel that patient is unable to express herself which then also triggered behaviors.    In-house psychology and psychiatry have been consulted with plan for Psychiatrist to visit patient in 2 days on scheduled visit.     Various reports have been filed d/t patient's aggression and hitting, sparking concern for safety of other residents.  [facility] regulating board is onsite today for monitoring.       Past Medical and Surgical History reviewed in Epic today.    MEDICATIONS:  Current Outpatient Medications   Medication Sig Dispense Refill     clotrimazole (LOTRIMIN) 1 % external cream Apply topically 2 times daily       divalproex sodium delayed-release (DEPAKOTE) 250 MG DR tablet Take 500 mg by mouth 3 times daily        docusate sodium (COLACE) 100 MG tablet Take 100 mg by mouth daily       DULoxetine (CYMBALTA) 20 MG capsule Take 20 mg by mouth daily       fluticasone (FLONASE) 50 MCG/ACT nasal spray " Spray 1 spray into both nostrils daily       gabapentin (NEURONTIN) 250 MG/5ML solution Take 100 mg by mouth every 4 hours as needed       gabapentin (NEURONTIN) 300 MG capsule Take 300 mg by mouth 2 times daily       LORazepam (ATIVAN) 0.5 MG tablet Take 1 mg by mouth every 4 hours as needed for anxiety x 14 days, Update NP in 12 days with usage and effectiveness       melatonin 5 MG tablet Take 5 mg by mouth nightly as needed If not used in 14 days, okay to discontinue.       Multiple Vitamins-Minerals (MULTIVITAL PO) Take 1 tablet by mouth daily       polyethylene glycol (MIRALAX/GLYCOLAX) packet Take 1 packet by mouth daily       risperiDONE (RISPERDAL) 0.5 MG tablet 0.5 mg 3 times daily and 0.5 mg q4h PRN for paranoia       traZODone (DESYREL) 150 MG tablet Take 150 mg by mouth        traZODone (DESYREL) 50 MG tablet Take 50 mg by mouth daily as needed for sleep For persistent insomnia. Also has 150 mg scheduled at bedtime Max 200 mg/night.       vitamin D3 (CHOLECALCIFEROL) 2000 units (50 mcg) tablet Take 1 tablet by mouth daily       REVIEW OF SYSTEMS:  Unobtainable secondary to cognitive impairment.     Objective:  /54   Pulse 90   Temp 97.3  F (36.3  C)   Resp 18   Wt 74.3 kg (163 lb 12.8 oz)   SpO2 97%   Exam:  GENERAL APPEARANCE:  Alert, in no distress, confused and not focusing on directions by staff.   RESP:  respiratory effort and palpation of chest normal, auscultation of lungs clear, no respiratory distress  CV:  Palpation and auscultation of heart done , rate and rhythm regular, no murmur, no LE peripheral edema  ABDOMEN:  normal bowel sounds, soft, nontender, no hepatosplenomegaly or other masses  M/S:   Gait and station ambulatory with 2WW, Digits and nails with arthritic changes, reduced muscle mass  SKIN:  Inspection and Palpation of skin and subcutaneous tissue pale, intact, no rashes appreciated  PSYCH:  insight and judgement, memory with severe impairment, affect and mood restless,  does not follow commands readily         Labs:   Labs done [facility] at [facility] done by [facility] lab. Pertinent results are: reviewed in pt's chart    ASSESSMENT/PLAN:     Bipolar affective disorder, current episode manic, current episode severity unspecified (H)  Dementia with behavioral disturbance, unspecified dementia type  Recurrent major depressive disorder, remission status unspecified (H)  Paranoia (H)  Insomnia due to medical condition     Very unstable Bipolar, Manic phase with dementia and depression/anxiety likely also contributing.      Early this AM was able to speak with Psychiatrist that will be seeing patient in 2 days to give report and ask for recommendations.    Noted no tremors or shaking so Depakote was increased to 500 mg TID and trough level was ordered for tomorrow AM.  Last trough level WNL. No apparent akathisia noted as patient was able to focus and sit still for brief periods of time.    Reviewed medications, behaviors, and responses (to meds and interventions) with following Psychiatrist.  Answered questions to the best of my abilities.  Following Psychiatrist reported she will need to complete a more thorough chart review and will see patient first thing Thursday AM (2 days away).      Visited patient in memory care unit later in the day where she accepted a hug from her nurse and was not aggressive towards her.  Other nursing staff directed her to the day room where she started folding laundry to some extent and when spoken to very gently, she was receptive at that moment.    Patient did not respond to questioning about pain; appeared to not be in pain at that moment.    Exam was completed while patient was ambulating to the day room.      Will increase Lorazepam dosing as nursing reporting 0.5 mg ineffective but combined 1 mg dosing more effective, although still only for short period of time.     Very much appreciate following Psychiatrist's recommendations as would like to  avoid in-patient stay if possible but also understand safety of other residents, visitors and staff need to be considered as well.  This has been discussed in details with nursing as well.    Will monitor for ability to assist further with patient's care and will monitor for recommendations by Psychiatrist.       transcribed by : Breonna Coleman  1. Discontinue previous Depakote order  2. Depakote 500 mg po TID. Dx: bipolar disorder  3. Complete depakote level on 6/26/19 in AM. Hold Depakote in AM until after lab draw  4. Discontinue Ativan order  5. Lorazepam 1 mg po every 4 hours prn x 14 days, Update NP in 12 days with usage and effectiveness. Dx: anxiety, restlessness    Electronically signed by:  ALYSSA Mckeon CNP               Sincerely,        ALYSSA Mckeon CNP

## 2019-06-25 NOTE — PROGRESS NOTES
[place] [facility]  [place] Medical Record Number:  [id number]  Place of Service where encounter took place:  [facility] ([facility] [[id number]]  Chief Complaint   Patient presents with     Nursing Home Acute       HPI:    Jayne Ross  is a 74 year old (1945), who is being seen today for an episodic care visit.  HPI information obtained from: facility chart records, facility staff, patient report and [facility] chart review. Today's concern is:     Bipolar affective disorder, current episode manic, current episode severity unspecified (H)  Dementia with behavioral disturbance, unspecified dementia type  Recurrent major depressive disorder, remission status unspecified (H)  Paranoia (H)  Insomnia due to medical condition     Per Epic note/Hx:  PMH including Bipolar; diagnosed with dementia in 2016 although family reports seeing symptoms 2 years prior.  She was living with her daughter but care needs became increased with more paranoia and aggressiveness so she moved to [facility] unit on 12/26/18 where she was placed on Zyprexa 5 mg BID.  She was transferred to [facility] 4/30/19-5/16/19 where she had an abnormal UA however UC was neg.  She was started on Haldol, lorazepam and valproic acid however continued to have behaviors of aggression, paranoia, restlessness and psychiatry recommended transfer to [facility] for naima-psych in-patient hospitalization for stability. While at [facility] Head CT was neg for acute processes.      Since arriving to [facility] for LTC placement, patient has been restless, aggressive and frequently requiring 1:1 nursing care.     Patient is currently on Depakote 500 mg twice daily (AM/PM) and 250 mg in afternoon, lorazepam 0.5 mg twice daily as needed with option to repeat dose in 1 hour if not effective, Risperdal 0.5 mg 3 times daily and 0.5 mg every 4 hours as needed, melatonin as needed, duloxetine 20 mg daily, trazodone 150 mg nightly, gabapentin  "300 mg twice daily and 100 mg every 4 hours as needed.  Nursing reporting PRN Risperdal used frequently and effective but very short duration of effectiveness.  PRN gabapentin used x1 and last 14 days as charted as \"effective\".  PRN lorazepam being used and nursing reporting patient more focused with 1 mg dosing, but still restless and aggressive at times.  Nursing reporting that today with scheduled and PRN risperidone plus PRN Ativan x2 patient was able to sleep for 20 minutes.    Nursing reporting behaviors including aggression including hitting, running her walker into staff and other residents and visitors, disrobing in common areas.  Nursing reporting patient is restless and often very determined, unable to be redirected.  Other staff reports that patient can be very tearful at times and repeating that she wants to go home, and does better with a soft/gentle touch.  Nursing has noticed that noise can trigger behaviors and often feel that patient is unable to express herself which then also triggered behaviors.    In-house psychology and psychiatry have been consulted with plan for Psychiatrist to visit patient in 2 days on scheduled visit.     Various reports have been filed d/t patient's aggression and hitting, sparking concern for safety of other residents.  [facility] regulating board is onsite today for monitoring.       Past Medical and Surgical History reviewed in Epic today.    MEDICATIONS:  Current Outpatient Medications   Medication Sig Dispense Refill     clotrimazole (LOTRIMIN) 1 % external cream Apply topically 2 times daily       divalproex sodium delayed-release (DEPAKOTE) 250 MG DR tablet Take 500 mg by mouth 3 times daily        docusate sodium (COLACE) 100 MG tablet Take 100 mg by mouth daily       DULoxetine (CYMBALTA) 20 MG capsule Take 20 mg by mouth daily       fluticasone (FLONASE) 50 MCG/ACT nasal spray Spray 1 spray into both nostrils daily       gabapentin (NEURONTIN) 250 MG/5ML solution " Take 100 mg by mouth every 4 hours as needed       gabapentin (NEURONTIN) 300 MG capsule Take 300 mg by mouth 2 times daily       LORazepam (ATIVAN) 0.5 MG tablet Take 1 mg by mouth every 4 hours as needed for anxiety x 14 days, Update NP in 12 days with usage and effectiveness       melatonin 5 MG tablet Take 5 mg by mouth nightly as needed If not used in 14 days, okay to discontinue.       Multiple Vitamins-Minerals (MULTIVITAL PO) Take 1 tablet by mouth daily       polyethylene glycol (MIRALAX/GLYCOLAX) packet Take 1 packet by mouth daily       risperiDONE (RISPERDAL) 0.5 MG tablet 0.5 mg 3 times daily and 0.5 mg q4h PRN for paranoia       traZODone (DESYREL) 150 MG tablet Take 150 mg by mouth        traZODone (DESYREL) 50 MG tablet Take 50 mg by mouth daily as needed for sleep For persistent insomnia. Also has 150 mg scheduled at bedtime Max 200 mg/night.       vitamin D3 (CHOLECALCIFEROL) 2000 units (50 mcg) tablet Take 1 tablet by mouth daily       REVIEW OF SYSTEMS:  Unobtainable secondary to cognitive impairment.     Objective:  /54   Pulse 90   Temp 97.3  F (36.3  C)   Resp 18   Wt 74.3 kg (163 lb 12.8 oz)   SpO2 97%   Exam:  GENERAL APPEARANCE:  Alert, in no distress, confused and not focusing on directions by staff.   RESP:  respiratory effort and palpation of chest normal, auscultation of lungs clear, no respiratory distress  CV:  Palpation and auscultation of heart done , rate and rhythm regular, no murmur, no LE peripheral edema  ABDOMEN:  normal bowel sounds, soft, nontender, no hepatosplenomegaly or other masses  M/S:   Gait and station ambulatory with 2WW, Digits and nails with arthritic changes, reduced muscle mass  SKIN:  Inspection and Palpation of skin and subcutaneous tissue pale, intact, no rashes appreciated  PSYCH:  insight and judgement, memory with severe impairment, affect and mood restless, does not follow commands readily         Labs:   Labs done while at [facility] done by [facility] lab. Pertinent results are: reviewed in pt's chart    ASSESSMENT/PLAN:     Bipolar affective disorder, current episode manic, current episode severity unspecified (H)  Dementia with behavioral disturbance, unspecified dementia type  Recurrent major depressive disorder, remission status unspecified (H)  Paranoia (H)  Insomnia due to medical condition     Very unstable Bipolar, Manic phase with dementia and depression/anxiety likely also contributing.      Early this AM was able to speak with Psychiatrist that will be seeing patient in 2 days to give report and ask for recommendations.    Noted no tremors or shaking so Depakote was increased to 500 mg TID and trough level was ordered for tomorrow AM.  Last trough level WNL. No apparent akathisia noted as patient was able to focus and sit still for brief periods of time.    Reviewed medications, behaviors, and responses (to meds and interventions) with following Psychiatrist.  Answered questions to the best of my abilities.  Following Psychiatrist reported she will need to complete a more thorough chart review and will see patient first thing Thursday AM (2 days away).      Visited patient in memory care unit later in the day where she accepted a hug from her nurse and was not aggressive towards her.  Other nursing staff directed her to the day room where she started folding laundry to some extent and when spoken to very gently, she was receptive at that moment.    Patient did not respond to questioning about pain; appeared to not be in pain at that moment.    Exam was completed while patient was ambulating to the day room.      Will increase Lorazepam dosing as nursing reporting 0.5 mg ineffective but combined 1 mg dosing more effective, although still only for short period of time.     Very much appreciate following Psychiatrist's recommendations as would like to avoid in-patient stay if possible but also understand safety of other residents,  visitors and staff need to be considered as well.  This has been discussed in details with nursing as well.    Will monitor for ability to assist further with patient's care and will monitor for recommendations by Psychiatrist.       transcribed by : Breonna Coleman  1. Discontinue previous Depakote order  2. Depakote 500 mg po TID. Dx: bipolar disorder  3. Complete depakote level on 6/26/19 in AM. Hold Depakote in AM until after lab draw  4. Discontinue Ativan order  5. Lorazepam 1 mg po every 4 hours prn x 14 days, Update NP in 12 days with usage and effectiveness. Dx: anxiety, restlessness    Electronically signed by:  ALYSSA Mckeon CNP

## 2019-07-01 DIAGNOSIS — F22 PARANOIA (H): ICD-10-CM

## 2019-07-01 DIAGNOSIS — F31.9 BIPOLAR AFFECTIVE DISORDER, REMISSION STATUS UNSPECIFIED (H): ICD-10-CM

## 2019-07-01 DIAGNOSIS — F33.9 RECURRENT MAJOR DEPRESSIVE DISORDER, REMISSION STATUS UNSPECIFIED (H): ICD-10-CM

## 2019-07-01 DIAGNOSIS — F31.10 BIPOLAR AFFECTIVE DISORDER, CURRENT EPISODE MANIC, CURRENT EPISODE SEVERITY UNSPECIFIED (H): Primary | ICD-10-CM

## 2019-07-01 DIAGNOSIS — F03.91 DEMENTIA WITH BEHAVIORAL DISTURBANCE, UNSPECIFIED DEMENTIA TYPE: ICD-10-CM

## 2019-07-01 DIAGNOSIS — F41.8 DEPRESSION WITH ANXIETY: ICD-10-CM

## 2019-07-01 DIAGNOSIS — G47.01 INSOMNIA DUE TO MEDICAL CONDITION: ICD-10-CM

## 2019-07-01 RX ORDER — LORAZEPAM 0.5 MG/1
1 TABLET ORAL EVERY 4 HOURS PRN
Qty: 30 TABLET | Refills: 1 | Status: SHIPPED | OUTPATIENT
Start: 2019-07-01 | End: 2019-07-09

## 2019-07-09 DIAGNOSIS — F22 PARANOIA (H): ICD-10-CM

## 2019-07-09 DIAGNOSIS — G47.01 INSOMNIA DUE TO MEDICAL CONDITION: ICD-10-CM

## 2019-07-09 DIAGNOSIS — F31.9 BIPOLAR AFFECTIVE DISORDER, REMISSION STATUS UNSPECIFIED (H): ICD-10-CM

## 2019-07-09 DIAGNOSIS — F33.9 RECURRENT MAJOR DEPRESSIVE DISORDER, REMISSION STATUS UNSPECIFIED (H): ICD-10-CM

## 2019-07-09 DIAGNOSIS — F41.8 DEPRESSION WITH ANXIETY: ICD-10-CM

## 2019-07-09 DIAGNOSIS — F03.91 DEMENTIA WITH BEHAVIORAL DISTURBANCE, UNSPECIFIED DEMENTIA TYPE: ICD-10-CM

## 2019-07-09 DIAGNOSIS — F31.10 BIPOLAR AFFECTIVE DISORDER, CURRENT EPISODE MANIC, CURRENT EPISODE SEVERITY UNSPECIFIED (H): ICD-10-CM

## 2019-07-09 RX ORDER — LORAZEPAM 0.5 MG/1
TABLET ORAL
Qty: 20 TABLET | Refills: 5 | Status: SHIPPED | OUTPATIENT
Start: 2019-07-09

## 2019-08-01 ENCOUNTER — NURSING HOME VISIT (OUTPATIENT)
Dept: GERIATRICS | Facility: CLINIC | Age: 74
End: 2019-08-01
Payer: COMMERCIAL

## 2019-08-01 VITALS
OXYGEN SATURATION: 98 % | RESPIRATION RATE: 16 BRPM | TEMPERATURE: 97.5 F | HEART RATE: 82 BPM | SYSTOLIC BLOOD PRESSURE: 136 MMHG | DIASTOLIC BLOOD PRESSURE: 70 MMHG | WEIGHT: 152.3 LBS

## 2019-08-01 DIAGNOSIS — Z53.9 ERRONEOUS ENCOUNTER--DISREGARD: Primary | ICD-10-CM

## 2019-08-22 ENCOUNTER — AMBULATORY - HEALTHEAST (OUTPATIENT)
Dept: OTHER | Facility: CLINIC | Age: 74
End: 2019-08-22

## 2019-08-22 ENCOUNTER — DOCUMENTATION ONLY (OUTPATIENT)
Dept: OTHER | Facility: CLINIC | Age: 74
End: 2019-08-22

## 2021-05-28 ENCOUNTER — RECORDS - HEALTHEAST (OUTPATIENT)
Dept: ADMINISTRATIVE | Facility: CLINIC | Age: 76
End: 2021-05-28

## 2021-05-30 VITALS — HEIGHT: 68 IN | BODY MASS INDEX: 24.55 KG/M2 | WEIGHT: 162 LBS

## 2021-05-31 VITALS — WEIGHT: 179 LBS | HEIGHT: 68 IN | BODY MASS INDEX: 27.13 KG/M2

## 2021-05-31 VITALS — WEIGHT: 166 LBS | BODY MASS INDEX: 25.16 KG/M2 | HEIGHT: 68 IN

## 2021-06-01 VITALS — HEIGHT: 68 IN | WEIGHT: 188 LBS | BODY MASS INDEX: 28.49 KG/M2

## 2021-06-01 VITALS — HEIGHT: 68 IN | WEIGHT: 189 LBS | BODY MASS INDEX: 28.64 KG/M2

## 2021-06-08 NOTE — PROGRESS NOTES
"Pt is here for psychiatric med management follow up. Pt reports an increase in depression, says \"this week has been bad on me. I am tired and I do not care if I die \". She was crying for two hours after the conversation with her daughter yesterday, was tearful today. Per pt, sleep is reasonable all right. C/o on having hard time concentration and \"screwing up with her meds \" . Her thought process is slow today. She denies active SI or plan but says she is thinking about it sometimes.     Correct pharmacy verified with patient and confirmed in snapshot? [x] yes []no    Medications Phoned  to Pharmacy [] yes [x]no  Name of Pharmacist:  List Medications, including dose, quantity and instructions      Medication Prescriptions given to patient   [] yes  [x] no   List the name of the drug the prescription was written for.       Medications ordered this visit were e-scribed.  Verified by order class [x] yes  [] no  Wellbutrin   Medication changes or discontinuations were communicated to patient's pharmacy: [] yes  [] no    UA collected [] yes    [x] no    [place] Prescription Monitoring Program Reviewed? [] yes  [x] no    Referrals were made to:  none  Future appointment was made: [x] yes  [] no  5/8/17  Dictation completed at time of chart check: [x] yes  [] no    I have checked the documentation for today s encounters and the above information has been reviewed and completed.      "

## 2021-06-08 NOTE — PROGRESS NOTES
Outpatient Followup Psychiatric Evaluation      Pertinent History: Patient has had a long-standing history of major depressive disorder.  She presents today for the purposes of medication management.     Current Symptoms:  Patient reports that she is much more depressed.  She again has stress related to a grandson who moved back home.  He's been there since he's been out of long-term.  This is been extremely stressful.  She also has a daughter that's living there that has her own depression issues.  She feels very overwhelmed by the circumstances.  Her grandson again was picked up by police apparently is a suspect surrounding the death of a college student.  She reports that she has very few support options from family or friends.  She is not interested in seeing a psychologist at this time despite my repeated suggestions that she do this.  No change in her sleep or appetite.  She does report she is more forgetful.  She denies having any active suicidal ideation and is able to contract for safety.  I did talk with her about treatment options and she is willing to augment her Prozac with Wellbutrin.  Risks and benefits were discussed.    Current Medications: Please see chart    Medication Compliance: Yes    Side Effects to Medications: No      Vitals:  Wt Readings from Last 3 Encounters:   02/06/17 162 lb (73.5 kg)   10/31/16 164 lb (74.4 kg)   03/14/16 177 lb (80.3 kg)     Temp Readings from Last 3 Encounters:   02/06/17 98  F (36.7  C) (Oral)   10/31/16 97.8  F (36.6  C)   03/14/16 97.9  F (36.6  C) (Oral)     BP Readings from Last 3 Encounters:   02/06/17 158/73   10/31/16 108/63   03/14/16 137/69     Pulse Readings from Last 3 Encounters:   02/06/17 67   10/31/16 71   03/14/16 82         Mental Status Exam:   Patient was flat slow and depressed.  She was near tears a couple of times and was more distractible.  He seemed to have some impaired concentration.  She was not in any physical pain was not short of breath.   There is no evidence of any psychosis.  No active suicidal or homicidal ideation.  Mood was depressed.  Affect was flat.  Thought formation is not sure to be loose.  She slow.  Insight, judgment and family do appear a bit more impaired and she also has less effort and she is more distractible today.    Diagnosis managed and treated at today's visit :    Axis I: Major depressive disorder, recurrent, moderate, with anxious features    Axis II: Deferred    Axis III: Please see intake note    Axis IV: Moderate    Axis V: GAF approximately 60    Plan:  Medication Adjustment:  I am going to add Wellbutrin  mg a day.    Other: Patient agrees to return to this clinic in 3 months for med check.  The patient declines my suggestion to set up individual therapy at this time.  I again spent quite a bit of time talking with her about setting limits with the family and not allowing her grandson to move back in with her.  She is reluctant to consider these options again.    Continue with the support of the clinic, reassurance, and redirection. Staff monitoring and ongoing assessments per team plan. Current psychotropic medication appears to represent the minimum effective dosage and appears medically necessary. We will continue to monitor and reassess. This team will utilize appropriate emergency services if necessary. I will make myself available if concerns or problems arise.    Jacob Coombs

## 2021-06-12 NOTE — PROGRESS NOTES
Correct pharmacy verified with patient and confirmed in snapshot? [x] yes []no    Medications Phoned  to Pharmacy [] yes [x]no  Name of Pharmacist:  List Medications, including dose, quantity and instructions      Medication Prescriptions given to patient   [] yes  [x] no   List the name of the drug the prescription was written for.       Medications ordered this visit were e-scribed.  Verified by order class [x] yes  [] no  Prozac 10 mg  Medication changes or discontinuations were communicated to patient's pharmacy: [] yes  [x] no    UA collected [] yes    [x] no    [place] Prescription Monitoring Program Reviewed? [] yes  [x] no    Referrals were made to:  none    Future appointment was made: [x] yes  [] no    Dictation completed at time of chart check: [x] yes  [] no    I have checked the documentation for today s encounters and the above information has been reviewed and completed.

## 2021-06-12 NOTE — PROGRESS NOTES
Pt here for follow up with her daughter who is now her POA.     DENAE signed today.    Pt is no longer taking Wellbutrin SR    Denies SI/HI thoughts at this time.

## 2021-06-12 NOTE — PROGRESS NOTES
Outpatient Followup Psychiatric Evaluation      Pertinent History: Patient has had a long-standing history of major depressive disorder.  Progressive cognitive decline over the last years with a suspected diagnosis of dementia.  She presents today for the purposes of medication management.     Current Symptoms:  Presents today with her daughter.  The daughter does present as per new POA for a variety of directives.  We did copy that paperwork.  Patient stopped all of her apparently over the winter.  She was seen in the emergency room in March with suicidal ideation.  She has restart Prozac.  She is no longer taking the Wellbutrin and is vague on the reason why.  She does however report she is doing better.  She has had periodic thoughts of wishing she was dead but no recent suicidal ideation or any such thoughts of wishing she was dead.  She is now living with her daughter who is a nurse and her power of .  That daughter is managing her medications.  The daughters recognized a slow cognitive decline for quite some time.  The patient has had no new falls and denies having any new neurologic symptoms.  She is following up with her medical doctor this for a full medical workup.  We did talk about the possibility more formalized cognitive testing but the daughter declines this stating that the diagnosis is already known and she is not sure what value that would be.  The daughter agrees that the patient is doing better since restarting the medication and does not feel the patient is suicidal.  She is noted no other medical concerns or complaints.  Patient reports she typically sleeps fairly well no change in appetite.  No side effects to the medication.  Both feel there is been partial improvement with Prozac and would like an increase in the Prozac at this time.  They like to continue with the as needed hydroxyzine which they report is helpful.    He had an episode of drinking vodka recently.  This was the only  episode and the daughter has now secured that opportunity.  The patient has articulated a desire to stay away from alcohol.    Current Medications: Please see chart    Medication Compliance: Yes    Side Effects to Medications: No      Vitals:  Wt Readings from Last 3 Encounters:   07/24/17 166 lb (75.3 kg)   02/06/17 162 lb (73.5 kg)   10/31/16 164 lb (74.4 kg)     Temp Readings from Last 3 Encounters:   02/06/17 98  F (36.7  C) (Oral)   10/31/16 97.8  F (36.6  C)   03/14/16 97.9  F (36.6  C) (Oral)     BP Readings from Last 3 Encounters:   07/24/17 141/60   02/06/17 158/73   10/31/16 108/63     Pulse Readings from Last 3 Encounters:   07/24/17 68   02/06/17 67   10/31/16 71         Mental Status Exam:   Patient was casually dressed.  No obvious pain.  She is slow.  She is a bit disorganized.  Attention and concentration are somewhat impaired.  She is a bit distractible and needs multiple prompts.  Mood is perhaps a bit depressed but not anxious or irritable.  Speech shows some word finding difficulty.  Very vague.  Occasionally slightly thick.  She is with simple responses.  Not pressured or rambling.  Thought content does not show any hallucinations or delusions.  No suicidal or homicidal ideation thought formation does not show the patient to be slow and flat.  Insight, judgment and memory continue to be impaired and there is been a gradual decrease.  Fund of knowledge is impaired.    Diagnosis managed and treated at today's visit :    Axis I: Major depressive disorder, recurrent, moderate, with anxious features    Axis II: Deferred    Axis III: Please see intake note    Axis IV: Moderate    Axis V: GAF approximately 60    Plan:  Medication Adjustment:  Increased the Prozac to 30 mg a day.  I discontinued the Wellbutrin that the patient has been off for over 6 months.    Other: We will await the results of the medical workup.  We will consider further neuropsychological testing but at this time the patient's  daughter declines that.  They will follow-up in 2 months with thoughts but agree to call or return sooner with any questions or concerns.    Continue with the support of the clinic, reassurance, and redirection. Staff monitoring and ongoing assessments per team plan. Current psychotropic medication appears to represent the minimum effective dosage and appears medically necessary. We will continue to monitor and reassess. This team will utilize appropriate emergency services if necessary. I will make myself available if concerns or problems arise.    Jacob Coombs

## 2021-06-15 NOTE — PROGRESS NOTES
Correct pharmacy verified with patient and confirmed in snapshot? [x] yes []no    Charge captured ? [x] yes  [] no    Medications Phoned  to Pharmacy [] yes [x]no  Name of Pharmacist:  List Medications, including dose, quantity and instructions      Medication Prescriptions given to patient   [] yes  [x] no   List the name of the drug the prescription was written for.       Medications ordered this visit were e-scribed.  Verified by order class [x] yes  [] no   hydroxyzine 25 mg   Medication changes or discontinuations were communicated to patient's pharmacy: [] yes  [x] no    UA collected [] yes  [x] no    [place] Prescription Monitoring Program Reviewed? [] yes  [x] no    Referrals were made to: Neuropsychological testing     Future appointment was made: [x] yes  [] no   4/23/18  Dictation completed at time of chart check: [x] yes  [] no    I have checked the documentation for today s encounters and the above information has been reviewed and completed.

## 2021-06-15 NOTE — PROGRESS NOTES
Pt here for follow up and medication management.    Daughter Jazmín is ok to stay in room with provider.    Daughter reports that once in awhile patient gets very mad and will yell and throw items, not to hurt anyone but to show how angry she is.    Confusion has gotten worse and the family has concerns about this.  Does have moments of clarity    Daughter Jazmín is requesting testing that was discuss some time ago.    SI/HI: Does have SI thoughts from time to time, denies plan or intent. Daughter does have crisis numbers to call soul they need to.

## 2021-06-15 NOTE — PROGRESS NOTES
Outpatient Followup Psychiatric Evaluation      Pertinent History: Patient has had a long-standing history of major depressive disorder.  Progressive cognitive decline over the last years with a suspected diagnosis of dementia.  Since the last visit I did increase her Prozac to 60 mg. That increase occurred a couple of weeks prior to this appointment.  The patient presents with her daughter today.  The daughter reports the patient has improved slightly with that increase and seems to be tolerating the medication.  She presents today for the purposes of medication management.     Current Symptoms:  Presents today with her daughter.  Daughter reports the patient continues with occasional outbursts.  She will throw items.  She will not throw them at people but rather displace her frustration.    The daughter reports that the patient does fairly well taking care of her self and getting her needs met throughout the day.  She is fairly independent.  She has had periods of some cognitive decline and does have some behavioral outbursts as described above.  She however admits this has been a chronic issue.  The patient chooses to stay up late and watch movies.  She then sleeps a lot during the day.  We discussed the importance of good sleep hygiene.  The patient is occasionally unsteady and we discussed the importance of staying hydrated and eating well.  She will continue to follow up with her medical doctor.    Patient endorses periodic confusion but is vague on details.  She is somewhat frustrated by this and we discussed the possibility of some neuropsych testing and both agreed today to do that.  Patient denies having hallucinations.  No suicidality.  No side effects to the medication.  She offers no other questions or concerns at this time.    Patient only occasionally has a Maryann if okayed and witnessed by her daughter.  Otherwise no alcohol use.  No other changes.  No side effects to the medications.      Current  Medications: Please see chart    Medication Compliance: Yes    Side Effects to Medications: No      Vitals:  Wt Readings from Last 3 Encounters:   01/15/18 179 lb (81.2 kg)   07/24/17 166 lb (75.3 kg)   02/06/17 162 lb (73.5 kg)     Temp Readings from Last 3 Encounters:   02/06/17 98  F (36.7  C) (Oral)   10/31/16 97.8  F (36.6  C)   03/14/16 97.9  F (36.6  C) (Oral)     BP Readings from Last 3 Encounters:   01/15/18 166/74   07/24/17 141/60   02/06/17 158/73     Pulse Readings from Last 3 Encounters:   01/15/18 66   07/24/17 68   02/06/17 67         Mental Status Exam:   It was slow and a bit disorganized.  Occasional pauses and looking to her daughter for assistance with recollection.  The patient appeared slightly anxious and needing some reassurance.  Not agitated.  No irritable fairly good eye contact.  Speech was soft spoken somewhat vague with occasional pauses.  She seemed to have organizational problems or word finding difficulties.  Not pressured or rambling.  Attention and concentration appear somewhat impaired and she did need occasional prompts and she needed structure.  Mood was somewhat depressed.  Affect was flat.  Thought content does not show any hallucinations or delusions.  No suicidal or homicidal ideation.  Thought formation does not show the patient to be loose insight, judgment and memory are all impaired with the recent slow decline according to the patient and daughter.    Diagnosis managed and treated at today's visit :    Major depressive disorder, recurrent, moderate, with anxious features    Plan:  Medication Adjustment:  Continue with the recent increase in the Prozac.  I have ordered neuropsychological testing.    Other: We will await the results of the medical workup.  We will consider further neuropsychological testing but at this time the patient's daughter declines that.  They will follow-up in 3 months with thoughts but agree to call or return sooner with any questions or  concerns.    Continue with the support of the clinic, reassurance, and redirection. Staff monitoring and ongoing assessments per team plan. Current psychotropic medication appears to represent the minimum effective dosage and appears medically necessary. We will continue to monitor and reassess. This team will utilize appropriate emergency services if necessary. I will make myself available if concerns or problems arise.    Jacob Coombs MD

## 2021-06-15 NOTE — PROGRESS NOTES
Corrected Add.    Correct pharmacy verified with patient and confirmed in snapshot? [x] yes []no    Charge captured ? [x] yes  [] no    Medications Phoned  to Pharmacy [x] yes []no  Name of Pharmacist: Lissett  List Medications, including dose, quantity and instructions  Hydroxyzine 25 mg Qty: 60 Refills: 1  SIG: Take 1 capsule by mouth 2 (two) times a day as needed for itching/anxiety    Medication Prescriptions given to patient   [] yes  [x] no   List the name of the drug the prescription was written for.       Medications ordered this visit were e-scribed.  Verified by order class [] yes  [x] no    Medication changes or discontinuations were communicated to patient's pharmacy: [] yes  [x] no    UA collected [] yes  [x] no    [place] Prescription Monitoring Program Reviewed? [] yes  [x] no    Referrals were made to:  None    Future appointment was made: [x] yes  [] no   04/23/18  Dictation completed at time of chart check: [x] yes  [] no    I have checked the documentation for today s encounters and the above information has been reviewed and completed.

## 2021-06-16 PROBLEM — Z02.9 ADMINISTRATIVE ENCOUNTER: Status: ACTIVE | Noted: 2018-08-27

## 2021-06-18 NOTE — PROGRESS NOTES
Outpatient Followup Psychiatric Evaluation      Pertinent History: Patient has had a long-standing history of major depressive disorder.  Progressive cognitive decline over the last years with a suspected diagnosis of dementia.  Increasing the patient's Prozac.  The patient recently had neuropsychological testing.  Please see the chart for full details.  She has been taken off many of her medical medications including the Lipitor and metformin.  The patient only rarely takes her trazodone.  She does not take the Vistaril was makes too tired.  She presents with her daughter who states her mood continues to be variable.  The daughter is caring more for her mother recently.    Recent neuropsychological reporting is available in the chart.  Pertinent section is as follows:  On the RBANS measure of cognitive functioning, Ms. Ross displayed significantly impaired functioning on measures of immediate and delayed memory as well as verbal attention. She did not benefit from recognition cueing of verbal information. Her visuospatial skills and simple measure of processing speed were within normal limits. Confrontation naming and semantic fluency were severely impaired.      On other measures of cognition, she performed within normal limits on measures of speeded visual scanning and visuospatial reasoning. She was significantly impaired on measures of speeded visual scanning with set-shifting, phonemic fluency, verbal abstract reasoning, and verbal attention.   Regarding her neuropsychiatric status, she endorsed mild symptoms of depression on a self-report measure.      On a collateral report of her ability to maintain activities of daily living, her daughter reported that she remains independent with housework and cleaning chores, doing laundry, toileting, feeding herself, hair grooming, dental hygiene, bathing, and nail care. She reported some minor difficulties in dressing and food preparation. She endorsed significant  difficulties in her mother s ability to use a phone, shop, travel alone, mange medications, and handle finances.      At this point in time, Ms. Ross is displaying significant issues in cognitive functioning, particularly within the domains of memory and attention. She does not appear to appreciably benefit from recognition cueing. Her amnestic profile and timeline of reported difficulties is consistent with those seen in dementia of the Alzheimer s type.      Diagnoses.   Major Neurocognitive Disorder, Likely Alzheimer s disease     RECOMMENDATIONS:     1. Ms. Ross s medical providers may wish to obtain labs for metabolic or vitamin abnormalities that may be contributing to her current cognitive profile (e.g., Vitamin D, B, CMP, MMA, RPR, TSH, etc). While these are likely not the cause of the majority of her cognitive problems given the chronicity and pattern over time, it is possible that they could be exacerbating an underlying degenerative process.      2. Ms. Ross was given handouts regarding compensatory mechanisms for memory and attention. These were also discussed during feedback.      3. Ms. Ross and her daughter were provided with handouts and resources from the local chapter of the Alzheimer s Association. These resources can be helpful in the future as they engage in care and estate planning as her cognitive and medical difficulties progress.      4. Ms. Ross and her daughter were encouraged to maintain physical and mental activities to maintain her quality of living to the extent possible given her current condition.      5. At the moment, Ms. Ross is managing basic activities of daily living. She and her daughter were encouraged to consider future planning in case she starts to have difficulty in maintaining ADLs and requires a higher level of care. The resources provided can also help assist with this process.            Current Symptoms:  Patient is a vague historian.  She recognizes that  she is having some memory problems.  We did spend some time talking about the neuropsychological results and they are to follow-up with her medical doctor as well.  We discussed treatment approaches.  We discussed the possibility of medications such as Aricept.  The daughter would like to avoid that if possible.  She works in the industry and has not been impressed with that also she reports her mother has been doing better recently and she does not want to add another medication.  The patient herself admits her mood is relatively good.  The patient apparently did go through some mood instability for a while with the last increase in Prozac but that has leveled off and she is actually improved.  The patient is sleeping relatively well.  No psychosis.  No suicidality.  No side effects to the medication.  The patient does live with family and they are able to supervisor.  She no longer uses the phone.  She is occasionally home for a short time but they feel that the current circumstances safe and they do not want any changes in living environment at this time.  They are aware of the potential for future decline in the patient's level of functioning.  Both the patient and daughter want to continue with the current treatment plan at this time.    Current Medications: Please see chart    Medication Compliance: Yes    Side Effects to Medications: No      Vitals:  Wt Readings from Last 3 Encounters:   06/04/18 189 lb (85.7 kg)   01/15/18 179 lb (81.2 kg)   07/24/17 166 lb (75.3 kg)     Temp Readings from Last 3 Encounters:   06/04/18 98.1  F (36.7  C) (Oral)   02/06/17 98  F (36.7  C) (Oral)   10/31/16 97.8  F (36.6  C)     BP Readings from Last 3 Encounters:   06/04/18 151/63   01/15/18 166/74   07/24/17 141/60     Pulse Readings from Last 3 Encounters:   06/04/18 71   01/15/18 66   07/24/17 68   Vitamin D, B, CMP, MMA, RPR, TSH,       Mental Status Exam:   Appearance: Patient presents appearing relatively comfortable.   No obvious distress.  Not short of breath.  No obvious pain.  Behavior: Patient maintains good behavioral dyscontrol.  He is currently not restless or agitated.  Speech: No obvious recent change.  Simple answers.  Fairly concrete.  He continues to need structure and prompts.  She is able to initiate.  Able to dialogue but needs occasional cues.  Mood/Affect: Not significantly depressed or anxious.  No lability or agitation.  Somewhat flat.  Thought Content: No reports of any recent psychosis.  No evidence of any psychosis.  Suicidal or Homicidal Thoughts:  None apparent or reported. The patient denies any suicidal or homicidal ideation.   Thought Process/Formulation: Slow.  Somewhat concrete and vague.  He is able to track and follow some conversation.  No racing thoughts.  Associations: No obvious recent change.  Somewhat concrete.  Able to process some simple information.  Fund of Knowledge: No significant recent change.  He continues somewhat impaired.  Attention/Concentration: Slow.  Somewhat concrete.  Still with impaired concentration.  Insight:  Grossly impaired.   Judgement:  Grossly unchanged.  Continues impaired  Memory:  Grossly fair.  Needing prompts and structure.    Motor Status:  No recent change reported. No current tremor.   Orientation: No reports of any recent change.    Diagnosis managed and treated at today's visit :    Major depressive disorder, recurrent, moderate, with anxious features    Probable Alzheimer's dementia    Plan:  Medication Adjustment:  Continue with the current medication regime.  Please see above.    Other: We will check Vitamin D, B, CMP, MMA, RPR, TSH the patient will follow up with her medical physician for additional medical workup.  She will continue with supervision by family.    Continue with the support of the clinic, reassurance, and redirection. Staff monitoring and ongoing assessments per team plan. Current psychotropic medication appears to represent the minimum  effective dosage and appears medically necessary. We will continue to monitor and reassess. This team will utilize appropriate emergency services if necessary. I will make myself available if concerns or problems arise.    Jacob Coombs MD

## 2021-06-18 NOTE — PROGRESS NOTES
Pt is here for routine psychiatric med management follow up and  accompanied by her daughter. Neurox performed on 5/22/18. Takes only Prozac and Vit D, hasn't been needed Vistaril or Trazodone.     Correct pharmacy verified with patient and confirmed in snapshot? [x] yes []no    Charge captured ? [x] yes  [] no    Medications Phoned  to Pharmacy [] yes [x]no  Name of Pharmacist:  List Medications, including dose, quantity and instructions      Medication Prescriptions given to patient   [] yes  [x] no   List the name of the drug the prescription was written for.       Medications ordered this visit were e-scribed.  Verified by order class [] yes  [x] no    Medication changes or discontinuations were communicated to patient's pharmacy: [] yes  [x] no    UA collected [] yes  [x] no    [place] Prescription Monitoring Program Reviewed? [] yes  [x] no    Referrals were made to:  no    Future appointment was made: [x] yes  [] no  8/27/18  Dictation completed at time of chart check: [x] yes  [] no    I have checked the documentation for today s encounters and the above information has been reviewed and completed.

## 2021-06-18 NOTE — PROGRESS NOTES
Neuropsychological Evaluation            Name: Jayne Ross                            Date of Evaluation: 05/22/2018                   Education: 12 years          Date of Birth (Age): 1945 (73)        REFERRING PROVIDER: Jacob Coombs MD    REFERRAL QUESTION:   Cognitive evaluation, report of memory problems from providers and patient.    This report was prepared by Arnold Escobar, Ph.D., ABPP, LP, Board Certified Clinical Neuropsychologist.    SUMMARY:    The current evaluation consisted of an interview and brief testing via a brief neuropsychological battery (RBANS), the [place] Making Test A&B, measures of phonemic and semantic fluency, aspects of the WAIS-IV, and mood questionnaires. The breakdown of test scores are as follows:    Immediate Memory:   40; <1%  Visuospatial Constructional:  92; 30%  Language:   51; <1%   Attention:    64; 1%  Delayed Memory   40; <1%    TMT A   42 /46T  TMT B   DC due to time  FAS     13/25T   Similarities   10/4/2%  Block Design   23/8/25%  Digit Span   9/1/0.1%    GDS    11 Mild    On the RBANS measure of cognitive functioning, Ms. Ross displayed significantly impaired functioning on measures of immediate and delayed memory as well as verbal attention. She did not benefit from recognition cueing of verbal information. Her visuospatial skills and simple measure of processing speed were within normal limits. Confrontation naming and semantic fluency were severely impaired.     On other measures of cognition, she performed within normal limits on measures of speeded visual scanning and visuospatial reasoning. She was significantly impaired on measures of speeded visual scanning with set-shifting, phonemic fluency, verbal abstract reasoning, and verbal attention.   Regarding her neuropsychiatric status, she endorsed mild symptoms of depression on a self-report measure.     On a collateral report of her ability to maintain activities of daily living, her daughter  reported that she remains independent with housework and cleaning chores, doing laundry, toileting, feeding herself, hair grooming, dental hygiene, bathing, and nail care. She reported some minor difficulties in dressing and food preparation. She endorsed significant difficulties in her mother s ability to use a phone, shop, travel alone, mange medications, and handle finances.     At this point in time, Ms. Ross is displaying significant issues in cognitive functioning, particularly within the domains of memory and attention. She does not appear to appreciably benefit from recognition cueing. Her amnestic profile and timeline of reported difficulties is consistent with those seen in dementia of the Alzheimer s type.     Diagnoses.   Major Neurocognitive Disorder, Likely Alzheimer s disease    RECOMMENDATIONS:    1. Ms. Ross s medical providers may wish to obtain labs for metabolic or vitamin abnormalities that may be contributing to her current cognitive profile (e.g., Vitamin D, B, CMP, MMA, RPR, TSH, etc). While these are likely not the cause of the majority of her cognitive problems given the chronicity and pattern over time, it is possible that they could be exacerbating an underlying degenerative process.     2. Ms. Ross was given handouts regarding compensatory mechanisms for memory and attention. These were also discussed during feedback.     3. Ms. Ross and her daughter were provided with handouts and resources from the local chapter of the Alzheimer s Association. These resources can be helpful in the future as they engage in care and estate planning as her cognitive and medical difficulties progress.     4. Ms. Ross and her daughter were encouraged to maintain physical and mental activities to maintain her quality of living to the extent possible given her current condition.     5. At the moment, Ms. Ross is managing basic activities of daily living. She and her daughter were encouraged to  consider future planning in case she starts to have difficulty in maintaining ADLs and requires a higher level of care. The resources provided can also help assist with this process.       Thank you for including me in the care of this patient. Please call me with further questions.     Arnold Escobar, Ph.D., . [facility]  Clinical Neuropsychologist  [phone]      _______________Extended Report_____________________    RELEVANT MEDICAL HISTORY    Ms. Ross has a medical and psychiatric history that is remarkable for major depressive disorder, recurrent.     The most recent neuroimaging available for review is a CT scan of the brain obtained on 5/14/2012 at [facility]. That scan noted  No evidence of bleed, shift, mass, mass effect or edema. Gray-White differentiation is preserved throughout. No acute calvarial abnormality.     There is no recent serum labwork, with the most recent being lipid cascade in 2016 being collected.     Current medications include fluoxetine. She has been prescribed trazadone and hydroxyzine, but it was reported that she is no longer using these medications.     Please see the electronic record for a full review of Ms. Ross s medical history.    PSYCHOSOCIAL HISTORY  Ms. Ross currently lives with her daughter in [place], [place]. 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 She denied the presence of ADHD or learning disorders. She worked as a  for many years before retiring at the age of 65 when she reportedly started having trouble remembering often-used codes and having a harder time with the job.     Regarding family medical history, it was reported that her father passed away in his 50 s from a heart attack and that her mother passed away in her 90 s from natural causes. She reported that there is a family history of heart disease. She was not aware of a strong family history of dementia or other  neurological conditions.     Ms. Ross reported that she smokes approximately one-half to one packs of cigarettes a day. She denied the current or past use of illicit drugs. She reported rare alcohol use.     PRESENTING CONCERNS  Ms. Ross presented to the current evaluation with complaints about her memory functioning. She reported that she has a difficult time remembering things such as important dates, birthdays, and the names of family members, especially her grandchildren. Her daughter reported that she often loses track of her train of thought in conversation, forgets the names of familiar people, and will lose track of tasks she is doing. Her problems with memory had become severe enough that she moved in with her daughter last year for care purposes. They reported that they noticed problems several years ago, as far back as her alf from her job, and that things have been getting worse over time, with her difficulties becoming especially notable in the past one to two years. They denied any distinct personality change over the years.     Functionally, she was using a pillbox for her medications, but had been making mistakes in filling it and taking the wrong day s medications, prompting her daughter to take over her medication management. Her daughter took over her finance management about a year ago when she moved in with her. Ms. Ross has not driven in the past year, and reported unease driving when she and the family decided that she stop driving. She is generally independent in basic activities of daily living and grooming, although her daughter reported that she would mix up thing like shampoo and conditioner during her bathing.     PHYSICAL AND EMOTIONAL FUNCTIONING  Ms. Ross reported that her hearing and vision have been stable in recent years. She reported that her weight has been relatively stable. Sleep quality has been variable. Her daughter reported that she would stay up until 1AM  and sleep until late morning at times. She also reported that sometimes she will come home from work and find that her mother had slept through the entire daytime hours. Her daughter reported that her balance and coordination has been  off  but that she has not sustained any recent falls, as she is usually able to grab a hold of something to steady herself before she falls. Ms. Ross denied any problems with dizziness. She denied any issues with headaches. The presence of word-finding difficulties was endorsed, with any problems with physical speech quality, such as volume and dysarthria being denied at this time.     Emotionally, recent mood was reported as  variable,  although it was stated that this is a longstanding pattern. Her daughter reported that when she becomes overwhelmed, her mother will have  meltdowns  and hide in her room. She reported that she has been this way for as long as she can remember. Suicidal ideation was denied at this time.      BEHAVIORAL OBSERVATIONS:  Ms. Ross arrived on time for the assessment. She was dressed casually, appropriately groomed, and appeared her stated age.  She ambulated independently, with no obvious signs of gait abnormality. She was oriented to person and place, but was unaware of the date, could not name the current or past president, and was unaware of the purpose of the evaluation, believing that she was there to see Dr. Coombs. Speech was notable for frequent word-finding difficulties. Speech volume was normal and no dysarthria was noted. Her affect was full and appropriate throughout interview and testing. There was no behavioral indication of obsessions, hallucinations, or delusional thinking during the evaluation.  The patient was generally pleasant and cooperative with the examiner.                                                                                                                                                                         TESTS  ADMINISTERED:  Clinical Interview; Validated measures of effort; Repeatable Battery for the Assessment of Neuropsychological Status (RBANS; Form A); Wechsler Adult Intelligence Scale - 4th Edition (WAIS-IV; Selected subtests); Trail Making Tests A & B (TMT A & B); [place] Naming Test (BNT); FAS; Clock Drawing; Geriatric Depression Inventory; Activities of Daily Living Scale (collateral)    Billing and Documentation:   The current evaluation consisted of 1 unit of 94571 spent in neurobehavioral status examination. 2 hours spent by psychometrist in test administration for 2 units of 70698.  4 hours of time spent in chart review, test administration by neuropsychologist, scoring of tests, integration/interpretation, gathering of collateral information, feedback of results and report writing) for 4 units of 19535. Note, the neuropsychologist administered tests of orientation, clock drawing and GDS.      Interpretation of test results  91%+    - Superior  75-90% - High Average  26-74% - Average  16-25% - Low Average  3-15%   - Mildly Impaired  1-2%     - Moderately Impaired  <1%      - Severely Impaired

## 2021-06-20 NOTE — PROGRESS NOTES
Correct pharmacy verified with patient and confirmed in snapshot? [x] yes []no    Charge captured ? [x] yes  [] no    Medications Phoned  to Pharmacy [] yes [x]no  Name of Pharmacist:  List Medications, including dose, quantity and instructions      Medication Prescriptions given to patient   [] yes  [x] no   List the name of the drug the prescription was written for.       Medications ordered this visit were e-scribed.  Verified by order class [x] yes  [] no   Seroquel 25 mg; Hydroxyzine HCl 25 mg   Medication changes or discontinuations were communicated to patient's pharmacy: [] yes  [x] no    UA collected [] yes  [x] no    [place] Prescription Monitoring Program Reviewed? [] yes  [x] no    Referrals were made to:  Ambulatory referral for Adult PT (walking)    Future appointment was made: [x] yes  [] no   12/03/18  Dictation completed at time of chart check: [x] yes  [] no    I have checked the documentation for today s encounters and the above information has been reviewed and completed.

## 2021-06-20 NOTE — PROGRESS NOTES
"Pt here for follow up and medication management.  Daughter is present today.      Pt is having trouble walking and unsteady a lot more.  Moods - trouble processing and feels at times people do not care. Manic phases are increasing.  Taking words out of context and at times a dialogue of her own  \"Sundowning\" possible.    Still smoking inside and trying to learn new behaviors.             "

## 2021-06-20 NOTE — PROGRESS NOTES
"Outpatient Followup Psychiatric Evaluation      Pertinent History: Patient has had a long-standing history of major depressive disorder.  Progressive cognitive decline over the last years with a suspected diagnosis of dementia.  Increasing the patient's Prozac.  The patient recently had neuropsychological testing.  Please see the chart for full details.  She has been taken off many of her medical medications including the Lipitor and metformin.  Recent neuropsychological testing stated in part, \"At this point in time, Ms. Ross is displaying significant issues in cognitive functioning, particularly within the domains of memory and attention. She does not appear to appreciably benefit from recognition cueing. Her amnestic profile and timeline of reported difficulties is consistent with those seen in dementia of the Alzheimer s type.\"  We did not make any changes at the last appointment    Current Symptoms:  The patient again is a poor historian but she presents with her mother who states the patient continues with slow decline in cognition.  They have talked with both me as well as the medical doctor about the possibility of Aricept, Namenda or another Alzheimer's treatment of depression but at this point she does not want to do that.  She reports her mother is occasionally irritable and agitated typically when she is confused or misinterprets things.  We did discuss redirection techniques.  Insurance company is requesting a different form in the risks of the Vistaril and I did make that change.  The daughter believes is helpful when she gets it every day and 5.  The daughter who works in the industry is requesting the possibility of as needed Seroquel trial and did make that available.  The patient is becoming a bit more unsteady and may need a wheelchair in the future.  I have referred her to physical therapy to further assess that.  The patient herself offers no concerns or complaints but is extremely vague and " repeats herself.  The daughter reports this is typical.  No change in sleep or appetite.  No new medical issues.  They continue to follow-up medically with her medical doctor.    Current Medications: Please see chart    Medication Compliance: Yes    Side Effects to Medications: No      Vitals:  Wt Readings from Last 3 Encounters:   08/27/18 188 lb (85.3 kg)   06/04/18 189 lb (85.7 kg)   01/15/18 179 lb (81.2 kg)     Temp Readings from Last 3 Encounters:   06/04/18 98.1  F (36.7  C) (Oral)   02/06/17 98  F (36.7  C) (Oral)   10/31/16 97.8  F (36.6  C)     BP Readings from Last 3 Encounters:   08/27/18 (!) 150/107   06/04/18 151/63   01/15/18 166/74     Pulse Readings from Last 3 Encounters:   08/27/18 76   06/04/18 71   01/15/18 66   Vitamin D, B, CMP, MMA, RPR, TSH,       Mental Status Exam:   Appearance:  The patient appears slow and actable.  Limited eye contact.  The patient does not appear to be currently uncomfortable or short of breath.  Behavior: Slow and flat.  Limited participation due to cognitive deficits.  Not currently restless or agitated.  No reports of any recent behavioral dyscontrol.  No reports of any recent significant change.  Speech: Patient is extremely vague and soft-spoken.  Frequent pauses.  She is disorganized and perseverative.  She is an extremely poor historian.  Mood/Affect: Awake, slow and flat.  Perhaps somewhat depressed.  Not currently anxious or labile. No current agitation. No reports of any recent significant change.  Thought Content:  No evidence of any psychosis. No reports of any recent psychosis.  Suicidal or Homicidal Thoughts: None reported.  None apparent.  The patient has had no suicidal behavior.  Thought Process/Formulation: Slow and flat.  She has a difficult time tracking and following.  The patient does need prompts and structure.  [place].  Vague.  No obvious racing thoughts.  No reports of any significant change.  The daughter however reports slow  decline.  Associations: Continues impaired.  Able to process some information.  Fund of Knowledge: Continues impaired by report and on exams.  Please see the therapy notes.  Attention/Concentration: Limited participation is disorganized.  She does appear to be interested..  Needing prompts.  Concentration appears impaired.  Slow.  Somewhat concrete.  Not disorganized.  The staff do not report any significant recent change.  Please see the chart notes.  Insight:  Grossly impaired.  No obvious recent change.  Please see therapy notes  Judgement:  Grossly impaired.  No obvious recent change  Memory:  Grossly impaired.  No obvious recent change.  Needing prompts and structure.  Please see therapy notes.  Motor Status:  No recent change reported. No current tremor.  Orientation: Continues to be impaired by report.  Please see the therapy notes.    Diagnosis managed and treated at today's visit :    Major depressive disorder, recurrent, moderate, with anxious features    Probable Alzheimer's dementia    Plan:  Medication Adjustment:  I have changed the form of Vistaril but we will continue with the dosage as the same.  Patient has not used any as needed trazodone but I will keep that available.  I did start the patient on low-dose as needed Seroquel for agitation during the day.    Other: I have referred the patient to physical therapy regarding her unsteady gait.  We will await the results of that assessment.    Continue with the support of the clinic, reassurance, and redirection. Staff monitoring and ongoing assessments per team plan. Current psychotropic medication appears to represent the minimum effective dosage and appears medically necessary. We will continue to monitor and reassess. This team will utilize appropriate emergency services if necessary. I will make myself available if concerns or problems arise.    Jacob Coombs MD